# Patient Record
Sex: MALE | Race: WHITE | Employment: OTHER | URBAN - METROPOLITAN AREA
[De-identification: names, ages, dates, MRNs, and addresses within clinical notes are randomized per-mention and may not be internally consistent; named-entity substitution may affect disease eponyms.]

---

## 2017-05-10 ENCOUNTER — TELEPHONE (OUTPATIENT)
Dept: SURGERY | Age: 77
End: 2017-05-10

## 2017-05-16 ENCOUNTER — OFFICE VISIT (OUTPATIENT)
Dept: SURGERY | Age: 77
End: 2017-05-16

## 2017-05-16 VITALS
SYSTOLIC BLOOD PRESSURE: 130 MMHG | HEIGHT: 71 IN | WEIGHT: 168 LBS | DIASTOLIC BLOOD PRESSURE: 84 MMHG | BODY MASS INDEX: 23.52 KG/M2

## 2017-05-16 DIAGNOSIS — N50.819 PAIN IN TESTICLE: Primary | ICD-10-CM

## 2017-05-16 PROCEDURE — 99212 OFFICE O/P EST SF 10 MIN: CPT | Performed by: SURGERY

## 2025-01-15 ENCOUNTER — TELEPHONE (OUTPATIENT)
Dept: INTERVENTIONAL RADIOLOGY/VASCULAR | Age: 85
End: 2025-01-15

## 2025-01-15 NOTE — TELEPHONE ENCOUNTER
Call placed to [facility] to obtain imaging for patient.  Patient had CT completed at [facility].  Call placed to [facility], Spoke with Lissa, Images are being pushed to [facility].

## 2025-01-16 ENCOUNTER — TELEPHONE (OUTPATIENT)
Dept: INTERVENTIONAL RADIOLOGY/VASCULAR | Age: 85
End: 2025-01-16

## 2025-01-16 NOTE — TELEPHONE ENCOUNTER
Called and spoke to Patient about upcoming procedure. Phone number used: [phone]   Procedure: Suprapubic Catheter placement  Approving Radiologist: Job     Pt informed of the following:  Date of procedure: 1/27/25  Arrival time of procedure: 0730  Time of procedure: 0830  Check in at Main Entrance prior to procedure.    Pre Procedure Checklist:   H & P completed within 30 days of scheduled procedure?No  If No  Call placed to PCP N/A    Allergies:  Reviewed?Yes  Have you ever had a reaction to Contrast/ IV Dye? No  If Yes, What was reaction?   Lidocaine Allergy?  No  If Yes, What was reaction?     Medications:  On any blood thinners? Yes.   If yes: ASA  Instructed to hold: ASA 3-5 days before procedure starting on     Blood pressure medication?  Yes. If yes, take the morning of procedure.     Sedation:  Sedation Type:IV Sedation    Any issues with sedation in the past? No  If Yes, What was reaction?   Patient will need a  the day of procedure.     Nothing to eat or drink after midnight.        Need SDS: No    Orders:  Pre-procedure orders placed.Yes   Lab ordered: CBC and PT/INR    Post-procedure recovery will be here 2-3 hours.

## 2025-01-27 ENCOUNTER — HOSPITAL ENCOUNTER (OUTPATIENT)
Dept: CT IMAGING | Age: 85
Discharge: HOME | End: 2025-01-27
Payer: MEDICARE

## 2025-01-27 ENCOUNTER — HOSPITAL ENCOUNTER (OUTPATIENT)
Age: 85
Discharge: HOME | End: 2025-01-27
Payer: MEDICARE

## 2025-01-27 VITALS
RESPIRATION RATE: 17 BRPM | TEMPERATURE: 98.1 F | HEIGHT: 68 IN | OXYGEN SATURATION: 98 % | DIASTOLIC BLOOD PRESSURE: 84 MMHG | HEART RATE: 80 BPM | BODY MASS INDEX: 23.79 KG/M2 | WEIGHT: 157 LBS | SYSTOLIC BLOOD PRESSURE: 145 MMHG

## 2025-01-27 DIAGNOSIS — R35.0 FREQUENCY OF MICTURITION: ICD-10-CM

## 2025-01-27 LAB
DEPRECATED RDW RBC AUTO: 13.9 % (ref 12.4–15.4)
HCT VFR BLD AUTO: 37.6 % (ref 40.5–52.5)
HGB BLD-MCNC: 12.8 G/DL (ref 13.5–17.5)
INR PPP: 1.17 (ref 0.85–1.15)
MCH RBC QN AUTO: 29.1 PG (ref 26–34)
MCHC RBC AUTO-ENTMCNC: 33.9 G/DL (ref 31–36)
MCV RBC AUTO: 85.8 FL (ref 80–100)
PLATELET # BLD AUTO: 340 K/UL (ref 135–450)
PMV BLD AUTO: 6.5 FL (ref 5–10.5)
PROTHROMBIN TIME: 15.2 SEC (ref 11.9–14.9)
RBC # BLD AUTO: 4.39 M/UL (ref 4.2–5.9)
WBC # BLD AUTO: 8.4 K/UL (ref 4–11)

## 2025-01-27 PROCEDURE — C1729 CATH, DRAINAGE: HCPCS

## 2025-01-27 PROCEDURE — 85027 COMPLETE CBC AUTOMATED: CPT

## 2025-01-27 PROCEDURE — 85610 PROTHROMBIN TIME: CPT

## 2025-01-27 PROCEDURE — 99152 MOD SED SAME PHYS/QHP 5/>YRS: CPT

## 2025-01-27 PROCEDURE — 36415 COLL VENOUS BLD VENIPUNCTURE: CPT

## 2025-01-27 PROCEDURE — 6360000002 HC RX W HCPCS: Performed by: STUDENT IN AN ORGANIZED HEALTH CARE EDUCATION/TRAINING PROGRAM

## 2025-01-27 RX ORDER — MIDAZOLAM HYDROCHLORIDE 5 MG/ML
INJECTION, SOLUTION INTRAMUSCULAR; INTRAVENOUS PRN
Status: COMPLETED | OUTPATIENT
Start: 2025-01-27 | End: 2025-01-27

## 2025-01-27 RX ORDER — SODIUM CHLORIDE 0.9 % (FLUSH) 0.9 %
5-40 SYRINGE (ML) INJECTION PRN
Status: DISCONTINUED | OUTPATIENT
Start: 2025-01-27 | End: 2025-01-28 | Stop reason: HOSPADM

## 2025-01-27 RX ORDER — SODIUM CHLORIDE 9 MG/ML
INJECTION, SOLUTION INTRAVENOUS PRN
Status: DISCONTINUED | OUTPATIENT
Start: 2025-01-27 | End: 2025-01-28 | Stop reason: HOSPADM

## 2025-01-27 RX ORDER — SODIUM CHLORIDE 0.9 % (FLUSH) 0.9 %
5-40 SYRINGE (ML) INJECTION EVERY 12 HOURS SCHEDULED
Status: DISCONTINUED | OUTPATIENT
Start: 2025-01-27 | End: 2025-01-28 | Stop reason: HOSPADM

## 2025-01-27 RX ORDER — ONDANSETRON 2 MG/ML
4 INJECTION INTRAMUSCULAR; INTRAVENOUS EVERY 8 HOURS PRN
Status: DISCONTINUED | OUTPATIENT
Start: 2025-01-27 | End: 2025-01-28 | Stop reason: HOSPADM

## 2025-01-27 RX ORDER — FENTANYL CITRATE 50 UG/ML
INJECTION, SOLUTION INTRAMUSCULAR; INTRAVENOUS PRN
Status: COMPLETED | OUTPATIENT
Start: 2025-01-27 | End: 2025-01-27

## 2025-01-27 RX ADMIN — MIDAZOLAM HYDROCHLORIDE 1 MG: 5 INJECTION, SOLUTION INTRAMUSCULAR; INTRAVENOUS at 09:02

## 2025-01-27 RX ADMIN — FENTANYL CITRATE 50 MCG: 50 INJECTION INTRAMUSCULAR; INTRAVENOUS at 09:01

## 2025-01-27 RX ADMIN — MIDAZOLAM HYDROCHLORIDE 0.5 MG: 5 INJECTION, SOLUTION INTRAMUSCULAR; INTRAVENOUS at 09:09

## 2025-01-27 RX ADMIN — FENTANYL CITRATE 25 MCG: 50 INJECTION INTRAMUSCULAR; INTRAVENOUS at 09:09

## 2025-01-27 ASSESSMENT — PAIN - FUNCTIONAL ASSESSMENT: PAIN_FUNCTIONAL_ASSESSMENT: NONE - DENIES PAIN

## 2025-01-27 NOTE — PROGRESS NOTES
Pt daughters at the bedside for support. Education and teaching demonstration for changing bag provided. Verbalized understanding.

## 2025-01-27 NOTE — BRIEF OP NOTE
Interventional Radiology  Brief Postoperative Note    Patient: Tj Barker  YOB: 1940  MRN: [id number]      Pre-operative Diagnosis: Urinary retention    Post-operative Diagnosis: Same    Procedure: CT guided suprapubic catheter placement    Anesthesia: Local and Moderate Sedation    Surgeons/Assistants: Artis Kaiser DO    Estimated Blood Loss: less than 50     Complications: None    Infection Present At Time Of Surgery (PATOS) (choose all levels that have infection present):  No infection present    Specimens: Was Not Obtained    Findings:   Successful placement of a 16-F locking pigtail suprapubic catheter.       Artis Kaiser DO  1/27/2025, 9:16 AM  Interventional Radiology  [phone])

## 2025-01-27 NOTE — PRE SEDATION
Sedation Pre-Procedure Note    Patient Name: Tj Barker   YOB: 1940  Room/Bed: Room/bed info not found  Medical Record Number: [id number]  Date: 1/27/2025   Time: 8:26 AM       Indication:  Urinary retention, suprapubic catheter placement requested.     Consent: I have discussed with the patient and/or the patient representative the indication, alternatives, and the possible risks and/or complications of the planned procedure and the anesthesia methods. The patient and/or patient representative appear to understand and agree to proceed.    Vital Signs:   Vitals:    01/27/25 0740   BP: 121/64   Pulse: 84   Resp: 18   Temp: 98.1 °F (36.7 °C)   SpO2: 97%       Past Medical History:   has a past medical history of BPH (benign prostatic hyperplasia), Cancer (HCC), Hyperlipidemia, Hypertension, and Parkinson disease (HCC).    Past Surgical History:   has a past surgical history that includes other surgical history; Inguinal hernia repair (Bilateral, 6/3/2016); and other surgical history (Right, 6/3/2016).    Medications:   Scheduled Meds:    sodium chloride flush  5-40 mL IntraVENous 2 times per day     Continuous Infusions:    sodium chloride       PRN Meds: sodium chloride flush, sodium chloride  Home Meds:   Prior to Admission medications    Medication Sig Start Date End Date Taking? Authorizing Provider   carbidopa-levodopa (PARCOPA)  MG TBDP Take 2 tablets by mouth 5 (five) times a day   Yes ProviderVern MD   gabapentin (NEURONTIN) 300 MG capsule Take 1 capsule by mouth 2 times daily.   Yes Provider, MD Vern   mirtazapine (REMERON) 15 MG tablet Take 1 tablet by mouth nightly   Yes ProviderVern MD   DULoxetine (CYMBALTA) 30 MG extended release capsule Take 1 capsule by mouth daily   Yes ProviderVern MD   doxazosin (CARDURA) 8 MG tablet Take 1 tablet by mouth nightly   Yes ProviderVern MD   atorvastatin (LIPITOR) 20 MG tablet Take 1 tablet by mouth

## 2025-01-27 NOTE — OR NURSING
Pt arrived for image guided sp tube insertion mid abd . Procedure explained including the risk and benefits of the procedure. All questions answered. Pt verbalizes understanding of the procedure and states no more questions. Consent confirmed. Vital signs stable. Labs, allergies, medications, and code status reviewed. No contraindications noted.     Vital Signs  Vitals:    01/27/25 0859   BP: (!) 159/83   Pulse: 80   Resp: 17   Temp:    SpO2: 100%    (vital signs in table format)    Pre Arturo Score  2 - Able to move 4 extremities voluntarily on command  2 - BP+/- 20mmHg of normal  2 - Fully awake  2 - Able to maintain oxygen saturation >92% on room air  2 - Able to breathe deeply and cough freely    Allergies  Oxycodone (allergies)    Labs  Lab Results   Component Value Date    INR 1.17 (H) 01/27/2025    PROTIME 15.2 (H) 01/27/2025     Lab Results   Component Value Date    CREATININE 0.8 06/03/2016    BUN 13 06/03/2016     06/03/2016    K 3.9 06/03/2016     06/03/2016    CO2 29 06/03/2016     Lab Results   Component Value Date    WBC 8.4 01/27/2025    HGB 12.8 (L) 01/27/2025    HCT 37.6 (L) 01/27/2025    MCV 85.8 01/27/2025     01/27/2025

## 2025-01-27 NOTE — OR NURSING
Image guided SP TUBE insertion MID ABD completed. Dr. Kaiser placed 16 Vincentian 25 cm Argon Skater drain LOT # [id number] EXP 10/10/29 in the MID ABD. Drain/tube secured at the 13 cm line with SUTURES. Drain/tube dressing clean, dry, and intact. Pt tolerated procedure without any signs or symptoms of distress. Vital signs stable. Report called to IR TEAM RN. Pt transported back to [facility] in stable condition via bed by transport.     Medications  Versed: 1.5 mg  Fentanyl: 75 mcg    This RN wasted the following with DOROTHY BARRON.  0.5 mg Versed  25 mcg Fentanyl     Vital Signs  Vitals:    01/27/25 0909   BP: (!) 151/88   Pulse: 81   Resp: 16   Temp:    SpO2: 100%    (vital signs in table format)    Post Arturo  2 - Able to move 4 extremities voluntarily on command  2 - BP+/- 20mmHg of normal  2 - Fully awake  1 - Needs oxygen to maintain oxygen saturation >90%  2 - Able to breathe deeply and cough freely

## 2025-02-14 ENCOUNTER — APPOINTMENT (OUTPATIENT)
Dept: GENERAL RADIOLOGY | Age: 85
DRG: 698 | End: 2025-02-14
Payer: MEDICARE

## 2025-02-14 ENCOUNTER — HOSPITAL ENCOUNTER (INPATIENT)
Age: 85
LOS: 3 days | Discharge: NURSING, REHAB OR LONG-TERM CARE | DRG: 698 | End: 2025-02-17
Attending: STUDENT IN AN ORGANIZED HEALTH CARE EDUCATION/TRAINING PROGRAM | Admitting: INTERNAL MEDICINE
Payer: MEDICARE

## 2025-02-14 ENCOUNTER — APPOINTMENT (OUTPATIENT)
Dept: CT IMAGING | Age: 85
DRG: 698 | End: 2025-02-14
Payer: MEDICARE

## 2025-02-14 DIAGNOSIS — N10 ACUTE PYELONEPHRITIS: ICD-10-CM

## 2025-02-14 DIAGNOSIS — N28.1 COMPLEX RENAL CYST: Primary | ICD-10-CM

## 2025-02-14 DIAGNOSIS — R60.9 PITTING EDEMA: ICD-10-CM

## 2025-02-14 PROBLEM — N30.01 ACUTE CYSTITIS WITH HEMATURIA: Status: ACTIVE | Noted: 2025-02-14

## 2025-02-14 LAB
ALBUMIN SERPL-MCNC: 3.4 G/DL (ref 3.4–5)
ALBUMIN/GLOB SERPL: 1 {RATIO} (ref 1.1–2.2)
ALP SERPL-CCNC: 67 U/L (ref 40–129)
ALT SERPL-CCNC: <5 U/L (ref 10–40)
AMORPH SED URNS QL MICRO: ABNORMAL /HPF
ANION GAP SERPL CALCULATED.3IONS-SCNC: 10 MMOL/L (ref 3–16)
AST SERPL-CCNC: 17 U/L (ref 15–37)
BASOPHILS # BLD: 0.1 K/UL (ref 0–0.2)
BASOPHILS NFR BLD: 0.4 %
BILIRUB SERPL-MCNC: <0.2 MG/DL (ref 0–1)
BILIRUB UR QL STRIP.AUTO: ABNORMAL
BUN SERPL-MCNC: 23 MG/DL (ref 7–20)
CALCIUM SERPL-MCNC: 8.7 MG/DL (ref 8.3–10.6)
CHARACTER UR: ABNORMAL
CHLORIDE SERPL-SCNC: 99 MMOL/L (ref 99–110)
CLARITY UR: CLEAR
CO2 SERPL-SCNC: 28 MMOL/L (ref 21–32)
COLOR UR: ABNORMAL
CREAT SERPL-MCNC: 0.6 MG/DL (ref 0.8–1.3)
CRYSTALS URNS MICRO: ABNORMAL /HPF
DEPRECATED RDW RBC AUTO: 14.5 % (ref 12.4–15.4)
EOSINOPHIL # BLD: 0.2 K/UL (ref 0–0.6)
EOSINOPHIL NFR BLD: 1.3 %
FLUAV RNA RESP QL NAA+PROBE: NOT DETECTED
FLUBV RNA RESP QL NAA+PROBE: NOT DETECTED
GFR SERPLBLD CREATININE-BSD FMLA CKD-EPI: >90 ML/MIN/{1.73_M2}
GLUCOSE SERPL-MCNC: 133 MG/DL (ref 70–99)
GLUCOSE UR STRIP.AUTO-MCNC: NEGATIVE MG/DL
HCT VFR BLD AUTO: 38.3 % (ref 40.5–52.5)
HGB BLD-MCNC: 12.8 G/DL (ref 13.5–17.5)
HGB UR QL STRIP.AUTO: NEGATIVE
KETONES UR STRIP.AUTO-MCNC: NEGATIVE MG/DL
LACTATE BLDV-SCNC: 1.3 MMOL/L (ref 0.4–1.9)
LEUKOCYTE ESTERASE UR QL STRIP.AUTO: ABNORMAL
LYMPHOCYTES # BLD: 0.9 K/UL (ref 1–5.1)
LYMPHOCYTES NFR BLD: 7.3 %
MCH RBC QN AUTO: 28.1 PG (ref 26–34)
MCHC RBC AUTO-ENTMCNC: 33.5 G/DL (ref 31–36)
MCV RBC AUTO: 83.9 FL (ref 80–100)
MONOCYTES # BLD: 0.9 K/UL (ref 0–1.3)
MONOCYTES NFR BLD: 6.8 %
NEUTROPHILS # BLD: 10.6 K/UL (ref 1.7–7.7)
NEUTROPHILS NFR BLD: 84.2 %
NITRITE UR QL STRIP.AUTO: POSITIVE
NT-PROBNP SERPL-MCNC: 220 PG/ML (ref 0–449)
PH UR STRIP.AUTO: 8.5 [PH] (ref 5–8)
PLATELET # BLD AUTO: 342 K/UL (ref 135–450)
PMV BLD AUTO: 6.7 FL (ref 5–10.5)
POTASSIUM SERPL-SCNC: 3.7 MMOL/L (ref 3.5–5.1)
PROCALCITONIN SERPL IA-MCNC: 0.04 NG/ML (ref 0–0.15)
PROT SERPL-MCNC: 6.8 G/DL (ref 6.4–8.2)
PROT UR STRIP.AUTO-MCNC: >=300 MG/DL
RBC # BLD AUTO: 4.56 M/UL (ref 4.2–5.9)
RBC #/AREA URNS HPF: ABNORMAL /HPF (ref 0–4)
RSV BY PCR: NOT DETECTED
SARS-COV-2 RNA RESP QL NAA+PROBE: NOT DETECTED
SODIUM SERPL-SCNC: 137 MMOL/L (ref 136–145)
SP GR UR STRIP.AUTO: <=1.005 (ref 1–1.03)
TROPONIN, HIGH SENSITIVITY: 40 NG/L (ref 0–22)
UA COMPLETE W REFLEX CULTURE PNL UR: ABNORMAL
UA DIPSTICK W REFLEX MICRO PNL UR: YES
URN SPEC COLLECT METH UR: ABNORMAL
UROBILINOGEN UR STRIP-ACNC: 0.2 E.U./DL
WBC # BLD AUTO: 12.5 K/UL (ref 4–11)
WBC #/AREA URNS HPF: ABNORMAL /HPF (ref 0–5)

## 2025-02-14 PROCEDURE — 83605 ASSAY OF LACTIC ACID: CPT

## 2025-02-14 PROCEDURE — 51798 US URINE CAPACITY MEASURE: CPT

## 2025-02-14 PROCEDURE — 85025 COMPLETE CBC W/AUTO DIFF WBC: CPT

## 2025-02-14 PROCEDURE — 82570 ASSAY OF URINE CREATININE: CPT

## 2025-02-14 PROCEDURE — 87077 CULTURE AEROBIC IDENTIFY: CPT

## 2025-02-14 PROCEDURE — 93005 ELECTROCARDIOGRAM TRACING: CPT

## 2025-02-14 PROCEDURE — 6360000004 HC RX CONTRAST MEDICATION

## 2025-02-14 PROCEDURE — 99285 EMERGENCY DEPT VISIT HI MDM: CPT

## 2025-02-14 PROCEDURE — 96365 THER/PROPH/DIAG IV INF INIT: CPT

## 2025-02-14 PROCEDURE — 6370000000 HC RX 637 (ALT 250 FOR IP)

## 2025-02-14 PROCEDURE — 84145 PROCALCITONIN (PCT): CPT

## 2025-02-14 PROCEDURE — 84156 ASSAY OF PROTEIN URINE: CPT

## 2025-02-14 PROCEDURE — 84484 ASSAY OF TROPONIN QUANT: CPT

## 2025-02-14 PROCEDURE — 80053 COMPREHEN METABOLIC PANEL: CPT

## 2025-02-14 PROCEDURE — 81001 URINALYSIS AUTO W/SCOPE: CPT

## 2025-02-14 PROCEDURE — 2580000003 HC RX 258

## 2025-02-14 PROCEDURE — 87186 SC STD MICRODIL/AGAR DIL: CPT

## 2025-02-14 PROCEDURE — 6360000002 HC RX W HCPCS

## 2025-02-14 PROCEDURE — 87086 URINE CULTURE/COLONY COUNT: CPT

## 2025-02-14 PROCEDURE — 96375 TX/PRO/DX INJ NEW DRUG ADDON: CPT

## 2025-02-14 PROCEDURE — 83880 ASSAY OF NATRIURETIC PEPTIDE: CPT

## 2025-02-14 PROCEDURE — 74177 CT ABD & PELVIS W/CONTRAST: CPT

## 2025-02-14 PROCEDURE — 71045 X-RAY EXAM CHEST 1 VIEW: CPT

## 2025-02-14 PROCEDURE — 1200000000 HC SEMI PRIVATE

## 2025-02-14 RX ORDER — IOPAMIDOL 755 MG/ML
75 INJECTION, SOLUTION INTRAVASCULAR
Status: COMPLETED | OUTPATIENT
Start: 2025-02-14 | End: 2025-02-14

## 2025-02-14 RX ORDER — KETOROLAC TROMETHAMINE 15 MG/ML
15 INJECTION, SOLUTION INTRAMUSCULAR; INTRAVENOUS ONCE
Status: COMPLETED | OUTPATIENT
Start: 2025-02-14 | End: 2025-02-14

## 2025-02-14 RX ORDER — ACETAMINOPHEN 325 MG/1
650 TABLET ORAL EVERY 6 HOURS PRN
Status: DISCONTINUED | OUTPATIENT
Start: 2025-02-14 | End: 2025-02-17 | Stop reason: HOSPADM

## 2025-02-14 RX ORDER — ACETAMINOPHEN 500 MG
1000 TABLET ORAL ONCE
Status: COMPLETED | OUTPATIENT
Start: 2025-02-14 | End: 2025-02-14

## 2025-02-14 RX ORDER — ONDANSETRON 2 MG/ML
4 INJECTION INTRAMUSCULAR; INTRAVENOUS EVERY 6 HOURS PRN
Status: DISCONTINUED | OUTPATIENT
Start: 2025-02-14 | End: 2025-02-17 | Stop reason: HOSPADM

## 2025-02-14 RX ORDER — SODIUM CHLORIDE 0.9 % (FLUSH) 0.9 %
10 SYRINGE (ML) INJECTION EVERY 12 HOURS SCHEDULED
Status: DISCONTINUED | OUTPATIENT
Start: 2025-02-15 | End: 2025-02-17 | Stop reason: HOSPADM

## 2025-02-14 RX ORDER — ASPIRIN 81 MG/1
81 TABLET, CHEWABLE ORAL DAILY
Status: DISCONTINUED | OUTPATIENT
Start: 2025-02-15 | End: 2025-02-17 | Stop reason: HOSPADM

## 2025-02-14 RX ORDER — SODIUM CHLORIDE 9 MG/ML
INJECTION, SOLUTION INTRAVENOUS CONTINUOUS
Status: ACTIVE | OUTPATIENT
Start: 2025-02-15 | End: 2025-02-15

## 2025-02-14 RX ORDER — VANCOMYCIN 1.25 G/250ML
1750 INJECTION, SOLUTION INTRAVENOUS ONCE
Status: COMPLETED | OUTPATIENT
Start: 2025-02-14 | End: 2025-02-15

## 2025-02-14 RX ORDER — NICOTINE 21 MG/24HR
1 PATCH, TRANSDERMAL 24 HOURS TRANSDERMAL DAILY PRN
Status: DISCONTINUED | OUTPATIENT
Start: 2025-02-14 | End: 2025-02-17 | Stop reason: HOSPADM

## 2025-02-14 RX ORDER — ACETAMINOPHEN 650 MG/1
650 SUPPOSITORY RECTAL EVERY 6 HOURS PRN
Status: DISCONTINUED | OUTPATIENT
Start: 2025-02-14 | End: 2025-02-17 | Stop reason: HOSPADM

## 2025-02-14 RX ORDER — SODIUM CHLORIDE 0.9 % (FLUSH) 0.9 %
10 SYRINGE (ML) INJECTION PRN
Status: DISCONTINUED | OUTPATIENT
Start: 2025-02-14 | End: 2025-02-17 | Stop reason: HOSPADM

## 2025-02-14 RX ORDER — PROMETHAZINE HYDROCHLORIDE 25 MG/1
12.5 TABLET ORAL EVERY 6 HOURS PRN
Status: DISCONTINUED | OUTPATIENT
Start: 2025-02-14 | End: 2025-02-17 | Stop reason: HOSPADM

## 2025-02-14 RX ORDER — ATORVASTATIN CALCIUM 10 MG/1
20 TABLET, FILM COATED ORAL DAILY
Status: DISCONTINUED | OUTPATIENT
Start: 2025-02-15 | End: 2025-02-17 | Stop reason: HOSPADM

## 2025-02-14 RX ORDER — MIRTAZAPINE 15 MG/1
15 TABLET, FILM COATED ORAL NIGHTLY
Status: DISCONTINUED | OUTPATIENT
Start: 2025-02-15 | End: 2025-02-15

## 2025-02-14 RX ORDER — SODIUM CHLORIDE 9 MG/ML
INJECTION, SOLUTION INTRAVENOUS PRN
Status: DISCONTINUED | OUTPATIENT
Start: 2025-02-14 | End: 2025-02-17 | Stop reason: HOSPADM

## 2025-02-14 RX ORDER — GABAPENTIN 300 MG/1
300 CAPSULE ORAL
Status: DISCONTINUED | OUTPATIENT
Start: 2025-02-15 | End: 2025-02-17 | Stop reason: HOSPADM

## 2025-02-14 RX ORDER — DULOXETIN HYDROCHLORIDE 60 MG/1
60 CAPSULE, DELAYED RELEASE ORAL DAILY
Status: DISCONTINUED | OUTPATIENT
Start: 2025-02-15 | End: 2025-02-17 | Stop reason: HOSPADM

## 2025-02-14 RX ORDER — CARBIDOPA AND LEVODOPA 25; 100 MG/1; MG/1
2 TABLET ORAL
Status: DISCONTINUED | OUTPATIENT
Start: 2025-02-15 | End: 2025-02-17 | Stop reason: HOSPADM

## 2025-02-14 RX ADMIN — KETOROLAC TROMETHAMINE 15 MG: 15 INJECTION, SOLUTION INTRAMUSCULAR; INTRAVENOUS at 19:14

## 2025-02-14 RX ADMIN — ACETAMINOPHEN 1000 MG: 500 TABLET ORAL at 19:14

## 2025-02-14 RX ADMIN — IOPAMIDOL 75 ML: 755 INJECTION, SOLUTION INTRAVENOUS at 18:48

## 2025-02-14 RX ADMIN — SODIUM CHLORIDE 1000 MG: 900 INJECTION INTRAVENOUS at 20:17

## 2025-02-14 RX ADMIN — HYDROMORPHONE HYDROCHLORIDE 1 MG: 1 INJECTION, SOLUTION INTRAMUSCULAR; INTRAVENOUS; SUBCUTANEOUS at 21:51

## 2025-02-14 ASSESSMENT — PAIN SCALES - GENERAL
PAINLEVEL_OUTOF10: 6
PAINLEVEL_OUTOF10: 8

## 2025-02-14 NOTE — ED PROVIDER NOTES
[facility] EMERGENCY DEPARTMENT  EMERGENCY DEPARTMENT ENCOUNTER        Pt Name: Tj Barker  MRN: [id number]  Birthdate 1940  Date of evaluation: 2/14/2025  Provider: GIORGIO Flores CNP  PCP: Mal Em MD  Note Started: 6:01 PM EST 2/14/25      KARO. I have evaluated this patient.        CHIEF COMPLAINT       Chief Complaint   Patient presents with    Urinary Catheter       HISTORY OF PRESENT ILLNESS: 1 or more Elements     History From: Patient, family member at bedside, EMR review    Chief Complaint: Suprapubic catheter malfunction    Tj Barker is a 84 y.o. male with a past medical history notable for hypertension, hyperlipidemia, BPH, Parkinson's disease who presents to the emergency department with concerns for suprapubic catheter dysfunction.  States that he has suprapubic catheter placed for BPH with Dr. Bedolla through the urology group approximately 2 weeks ago.  The family member at bedside states that the patient had an episode where he spontaneously voided through his urethra as his depends was full.  Has had approximately 250 mL urinary output in the suprapubic catheter bag today.  Patient feels a full sensation in the suprapubic region.  States that while they did contact the urology clinic they waited until about 5 PM to do so and it was too late to be seen in the clinic and therefore they were referred to the emergency department.  The patient himself denies any other subjective complaints outside of a full sensation within the suprapubic region.    Nursing Notes were all reviewed and agreed with or any disagreements were addressed in the HPI.    REVIEW OF SYSTEMS :      Review of Systems   Constitutional:  Negative for chills, fatigue and fever.   Respiratory:  Negative for chest tightness and shortness of breath.    Cardiovascular:  Negative for chest pain and palpitations.   Gastrointestinal:  Positive for abdominal pain (Suprapubic). Negative for nausea

## 2025-02-14 NOTE — ED TRIAGE NOTES
Patient presents to the ED from home with c/o urinary catheter problem. Patient states he feels like his catheter isnt draining and he feels a full sensation. Patient wants his catheter changed.

## 2025-02-15 ENCOUNTER — APPOINTMENT (OUTPATIENT)
Age: 85
DRG: 698 | End: 2025-02-15
Attending: INTERNAL MEDICINE
Payer: MEDICARE

## 2025-02-15 LAB
ALBUMIN SERPL-MCNC: 3.1 G/DL (ref 3.4–5)
ALBUMIN/GLOB SERPL: 1 {RATIO} (ref 1.1–2.2)
ALP SERPL-CCNC: 62 U/L (ref 40–129)
ALT SERPL-CCNC: <5 U/L (ref 10–40)
ANION GAP SERPL CALCULATED.3IONS-SCNC: 10 MMOL/L (ref 3–16)
AST SERPL-CCNC: 16 U/L (ref 15–37)
BASOPHILS # BLD: 0.1 K/UL (ref 0–0.2)
BASOPHILS NFR BLD: 0.7 %
BILIRUB SERPL-MCNC: 0.3 MG/DL (ref 0–1)
BUN SERPL-MCNC: 25 MG/DL (ref 7–20)
CALCIUM SERPL-MCNC: 8.2 MG/DL (ref 8.3–10.6)
CHLORIDE SERPL-SCNC: 102 MMOL/L (ref 99–110)
CO2 SERPL-SCNC: 25 MMOL/L (ref 21–32)
CREAT SERPL-MCNC: 0.7 MG/DL (ref 0.8–1.3)
DEPRECATED RDW RBC AUTO: 14.2 % (ref 12.4–15.4)
ECHO AO ROOT DIAM: 3.6 CM
ECHO AO ROOT INDEX: 1.91 CM/M2
ECHO AV AREA PEAK VELOCITY: 1.6 CM2
ECHO AV AREA VTI: 1.7 CM2
ECHO AV AREA/BSA PEAK VELOCITY: 0.9 CM2/M2
ECHO AV AREA/BSA VTI: 0.9 CM2/M2
ECHO AV MEAN GRADIENT: 17 MMHG
ECHO AV MEAN VELOCITY: 1.9 M/S
ECHO AV PEAK GRADIENT: 34 MMHG
ECHO AV PEAK VELOCITY: 2.9 M/S
ECHO AV VELOCITY RATIO: 0.41
ECHO AV VTI: 58.2 CM
ECHO BSA: 1.88 M2
ECHO EST RA PRESSURE: 8 MMHG
ECHO LA AREA 2C: 24.5 CM2
ECHO LA AREA 4C: 24.5 CM2
ECHO LA MAJOR AXIS: 6.3 CM
ECHO LA MINOR AXIS: 5.8 CM
ECHO LA VOL BP: 83 ML (ref 18–58)
ECHO LA VOL MOD A2C: 83 ML (ref 18–58)
ECHO LA VOL MOD A4C: 68 ML (ref 18–58)
ECHO LA VOL/BSA BIPLANE: 44 ML/M2 (ref 16–34)
ECHO LA VOLUME INDEX MOD A2C: 44 ML/M2 (ref 16–34)
ECHO LA VOLUME INDEX MOD A4C: 36 ML/M2 (ref 16–34)
ECHO LV E' LATERAL VELOCITY: 7.51 CM/S
ECHO LV E' SEPTAL VELOCITY: 6.09 CM/S
ECHO LV EF PHYSICIAN: 55 %
ECHO LV FRACTIONAL SHORTENING: 29 % (ref 28–44)
ECHO LV INTERNAL DIMENSION DIASTOLE INDEX: 1.81 CM/M2
ECHO LV INTERNAL DIMENSION DIASTOLIC: 3.4 CM (ref 4.2–5.9)
ECHO LV INTERNAL DIMENSION SYSTOLIC INDEX: 1.28 CM/M2
ECHO LV INTERNAL DIMENSION SYSTOLIC: 2.4 CM
ECHO LV IVSD: 1.1 CM (ref 0.6–1)
ECHO LV MASS 2D: 106.3 G (ref 88–224)
ECHO LV MASS INDEX 2D: 56.6 G/M2 (ref 49–115)
ECHO LV POSTERIOR WALL DIASTOLIC: 1 CM (ref 0.6–1)
ECHO LV RELATIVE WALL THICKNESS RATIO: 0.59
ECHO LVOT AREA: 3.8 CM2
ECHO LVOT AV VTI INDEX: 0.45
ECHO LVOT DIAM: 2.2 CM
ECHO LVOT MEAN GRADIENT: 3 MMHG
ECHO LVOT PEAK GRADIENT: 6 MMHG
ECHO LVOT PEAK VELOCITY: 1.2 M/S
ECHO LVOT STROKE VOLUME INDEX: 53.4 ML/M2
ECHO LVOT SV: 100.3 ML
ECHO LVOT VTI: 26.4 CM
ECHO MV A VELOCITY: 1.4 M/S
ECHO MV AREA VTI: 3 CM2
ECHO MV E DECELERATION TIME (DT): 412 MS
ECHO MV E VELOCITY: 0.9 M/S
ECHO MV E/A RATIO: 0.64
ECHO MV E/E' LATERAL: 11.98
ECHO MV E/E' RATIO (AVERAGED): 13.38
ECHO MV E/E' SEPTAL: 14.78
ECHO MV LVOT VTI INDEX: 1.25
ECHO MV MAX VELOCITY: 1.6 M/S
ECHO MV MEAN GRADIENT: 10 MMHG
ECHO MV MEAN VELOCITY: 0.9 M/S
ECHO MV PEAK GRADIENT: 10 MMHG
ECHO MV VTI: 32.9 CM
ECHO PV MAX VELOCITY: 1 M/S
ECHO PV MEAN GRADIENT: 3 MMHG
ECHO PV MEAN VELOCITY: 0.8 M/S
ECHO PV PEAK GRADIENT: 4 MMHG
ECHO PV VTI: 26.9 CM
ECHO RA AREA 4C: 20.9 CM2
ECHO RA END SYSTOLIC VOLUME APICAL 4 CHAMBER INDEX BSA: 31 ML/M2
ECHO RA VOLUME: 59 ML
ECHO RIGHT VENTRICULAR SYSTOLIC PRESSURE (RVSP): 24 MMHG
ECHO RV BASAL DIMENSION: 4.4 CM
ECHO RV FREE WALL PEAK S': 11.1 CM/S
ECHO RV LONGITUDINAL DIMENSION: 7.6 CM
ECHO RV MID DIMENSION: 3.2 CM
ECHO RV TAPSE: 2.3 CM (ref 1.7–?)
ECHO TV REGURGITANT MAX VELOCITY: 2 M/S
ECHO TV REGURGITANT PEAK GRADIENT: 17 MMHG
EKG ATRIAL RATE: 104 BPM
EKG DIAGNOSIS: NORMAL
EKG P AXIS: 40 DEGREES
EKG P-R INTERVAL: 184 MS
EKG Q-T INTERVAL: 346 MS
EKG QRS DURATION: 78 MS
EKG QTC CALCULATION (BAZETT): 454 MS
EKG R AXIS: 26 DEGREES
EKG T AXIS: 77 DEGREES
EKG VENTRICULAR RATE: 104 BPM
EOSINOPHIL # BLD: 0.2 K/UL (ref 0–0.6)
EOSINOPHIL NFR BLD: 1.5 %
GFR SERPLBLD CREATININE-BSD FMLA CKD-EPI: 89 ML/MIN/{1.73_M2}
GLUCOSE SERPL-MCNC: 110 MG/DL (ref 70–99)
HCT VFR BLD AUTO: 35.1 % (ref 40.5–52.5)
HGB BLD-MCNC: 12 G/DL (ref 13.5–17.5)
LYMPHOCYTES # BLD: 1.2 K/UL (ref 1–5.1)
LYMPHOCYTES NFR BLD: 11.3 %
MCH RBC QN AUTO: 28.5 PG (ref 26–34)
MCHC RBC AUTO-ENTMCNC: 34.3 G/DL (ref 31–36)
MCV RBC AUTO: 83.1 FL (ref 80–100)
MONOCYTES # BLD: 0.9 K/UL (ref 0–1.3)
MONOCYTES NFR BLD: 8.3 %
NEUTROPHILS # BLD: 8.4 K/UL (ref 1.7–7.7)
NEUTROPHILS NFR BLD: 78.2 %
PLATELET # BLD AUTO: 298 K/UL (ref 135–450)
PMV BLD AUTO: 6.7 FL (ref 5–10.5)
POTASSIUM SERPL-SCNC: 3.7 MMOL/L (ref 3.5–5.1)
POTASSIUM SERPL-SCNC: 3.7 MMOL/L (ref 3.5–5.1)
PROT SERPL-MCNC: 6.2 G/DL (ref 6.4–8.2)
RBC # BLD AUTO: 4.22 M/UL (ref 4.2–5.9)
SODIUM SERPL-SCNC: 137 MMOL/L (ref 136–145)
TROPONIN, HIGH SENSITIVITY: 50 NG/L (ref 0–22)
WBC # BLD AUTO: 10.7 K/UL (ref 4–11)

## 2025-02-15 PROCEDURE — 6370000000 HC RX 637 (ALT 250 FOR IP): Performed by: INTERNAL MEDICINE

## 2025-02-15 PROCEDURE — 1200000000 HC SEMI PRIVATE

## 2025-02-15 PROCEDURE — 85025 COMPLETE CBC W/AUTO DIFF WBC: CPT

## 2025-02-15 PROCEDURE — 97535 SELF CARE MNGMENT TRAINING: CPT

## 2025-02-15 PROCEDURE — 93306 TTE W/DOPPLER COMPLETE: CPT | Performed by: INTERNAL MEDICINE

## 2025-02-15 PROCEDURE — 36415 COLL VENOUS BLD VENIPUNCTURE: CPT

## 2025-02-15 PROCEDURE — 6360000002 HC RX W HCPCS: Performed by: INTERNAL MEDICINE

## 2025-02-15 PROCEDURE — 2500000003 HC RX 250 WO HCPCS: Performed by: INTERNAL MEDICINE

## 2025-02-15 PROCEDURE — 97530 THERAPEUTIC ACTIVITIES: CPT

## 2025-02-15 PROCEDURE — 93010 ELECTROCARDIOGRAM REPORT: CPT | Performed by: INTERNAL MEDICINE

## 2025-02-15 PROCEDURE — 97166 OT EVAL MOD COMPLEX 45 MIN: CPT

## 2025-02-15 PROCEDURE — 2580000003 HC RX 258: Performed by: INTERNAL MEDICINE

## 2025-02-15 PROCEDURE — 80053 COMPREHEN METABOLIC PANEL: CPT

## 2025-02-15 PROCEDURE — 84484 ASSAY OF TROPONIN QUANT: CPT

## 2025-02-15 PROCEDURE — 93306 TTE W/DOPPLER COMPLETE: CPT

## 2025-02-15 PROCEDURE — 97162 PT EVAL MOD COMPLEX 30 MIN: CPT

## 2025-02-15 PROCEDURE — 99232 SBSQ HOSP IP/OBS MODERATE 35: CPT | Performed by: INTERNAL MEDICINE

## 2025-02-15 PROCEDURE — 87040 BLOOD CULTURE FOR BACTERIA: CPT

## 2025-02-15 PROCEDURE — 6370000000 HC RX 637 (ALT 250 FOR IP)

## 2025-02-15 RX ORDER — DOXAZOSIN 2 MG/1
8 TABLET ORAL NIGHTLY
Status: DISCONTINUED | OUTPATIENT
Start: 2025-02-15 | End: 2025-02-17 | Stop reason: HOSPADM

## 2025-02-15 RX ORDER — POLYETHYLENE GLYCOL 3350 17 G/17G
8.5 POWDER, FOR SOLUTION ORAL EVERY OTHER DAY
COMMUNITY

## 2025-02-15 RX ADMIN — SODIUM CHLORIDE: 0.9 INJECTION, SOLUTION INTRAVENOUS at 01:26

## 2025-02-15 RX ADMIN — GABAPENTIN 300 MG: 300 CAPSULE ORAL at 01:26

## 2025-02-15 RX ADMIN — CARBIDOPA AND LEVODOPA 2 TABLET: 25; 100 TABLET ORAL at 11:43

## 2025-02-15 RX ADMIN — SODIUM CHLORIDE, PRESERVATIVE FREE 10 ML: 5 INJECTION INTRAVENOUS at 20:17

## 2025-02-15 RX ADMIN — CEFEPIME 2000 MG: 2 INJECTION, POWDER, FOR SOLUTION INTRAVENOUS at 17:52

## 2025-02-15 RX ADMIN — ACETAMINOPHEN 650 MG: 325 TABLET ORAL at 02:29

## 2025-02-15 RX ADMIN — HYDROMORPHONE HYDROCHLORIDE 0.5 MG: 1 INJECTION, SOLUTION INTRAMUSCULAR; INTRAVENOUS; SUBCUTANEOUS at 04:38

## 2025-02-15 RX ADMIN — CARBIDOPA AND LEVODOPA 2 TABLET: 25; 100 TABLET ORAL at 15:12

## 2025-02-15 RX ADMIN — ATORVASTATIN CALCIUM 20 MG: 10 TABLET, FILM COATED ORAL at 09:48

## 2025-02-15 RX ADMIN — DOXAZOSIN MESYLATE 8 MG: 2 TABLET ORAL at 20:16

## 2025-02-15 RX ADMIN — ASPIRIN 81 MG: 81 TABLET, CHEWABLE ORAL at 09:48

## 2025-02-15 RX ADMIN — VANCOMYCIN HYDROCHLORIDE 1000 MG: 1 INJECTION, POWDER, LYOPHILIZED, FOR SOLUTION INTRAVENOUS at 11:49

## 2025-02-15 RX ADMIN — CEFEPIME 2000 MG: 2 INJECTION, POWDER, FOR SOLUTION INTRAVENOUS at 04:38

## 2025-02-15 RX ADMIN — VANCOMYCIN 1750 MG: 1.25 INJECTION, SOLUTION INTRAVENOUS at 00:12

## 2025-02-15 RX ADMIN — CARBIDOPA AND LEVODOPA 2 TABLET: 25; 100 TABLET ORAL at 17:52

## 2025-02-15 RX ADMIN — CARBIDOPA AND LEVODOPA 2 TABLET: 25; 100 TABLET ORAL at 06:31

## 2025-02-15 RX ADMIN — GABAPENTIN 300 MG: 300 CAPSULE ORAL at 20:16

## 2025-02-15 RX ADMIN — DULOXETINE HYDROCHLORIDE 60 MG: 60 CAPSULE, DELAYED RELEASE ORAL at 09:48

## 2025-02-15 RX ADMIN — VANCOMYCIN HYDROCHLORIDE 1000 MG: 1 INJECTION, POWDER, LYOPHILIZED, FOR SOLUTION INTRAVENOUS at 23:17

## 2025-02-15 ASSESSMENT — PAIN SCALES - GENERAL
PAINLEVEL_OUTOF10: 0
PAINLEVEL_OUTOF10: 0
PAINLEVEL_OUTOF10: 7
PAINLEVEL_OUTOF10: 3

## 2025-02-15 ASSESSMENT — PAIN DESCRIPTION - LOCATION
LOCATION: ABDOMEN
LOCATION: ABDOMEN

## 2025-02-15 ASSESSMENT — PAIN DESCRIPTION - DESCRIPTORS
DESCRIPTORS: ACHING;DISCOMFORT
DESCRIPTORS: ACHING;DISCOMFORT

## 2025-02-15 ASSESSMENT — PAIN SCALES - WONG BAKER: WONGBAKER_NUMERICALRESPONSE: NO HURT

## 2025-02-15 NOTE — PROGRESS NOTES
4 Eyes Skin Assessment     NAME:  Tj Barker  YOB: 1940  MEDICAL RECORD NUMBER:  [id number]  Patient has scattered bruising, no open areas noted and or reported. Suprapubic catheter in place. Patient hard or hearing, daughter at bedside  The patient is being assessed for  Admission    I agree that at least one RN has performed a thorough Head to Toe Skin Assessment on the patient. ALL assessment sites listed below have been assessed.      Areas assessed by both nurses:    Head, Face, Ears, Shoulders, Back, Chest, Arms, Elbows, Hands, Sacrum. Buttock, Coccyx, Ischium, and Legs. Feet and Heels        Does the Patient have a Wound? No noted wound(s)       Vini Prevention initiated by RN: No  Wound Care Orders initiated by RN: No    Pressure Injury (Stage 3,4, Unstageable, DTI, NWPT, and Complex wounds) if present, place Wound referral order by RN under : No    New Ostomies, if present place, Ostomy referral order under : No     Nurse 1 eSignature: Electronically signed by Shankar Vallejo RN on 2/15/25 at 6:23 AM EST    **SHARE this note so that the co-signing nurse can place an eSignature**    Nurse 2 eSignature: Electronically signed by Marika Byrd RN on 2/15/25 at 6:28 AM EST

## 2025-02-15 NOTE — PROGRESS NOTES
Inpatient Physical Therapy Evaluation & Treatment    [facility]  Date:  2/15/2025  Patient Name:    Tj Barker  Admitting diagnosis:  Acute pyelonephritis [N10]  Pitting edema [R60.9]  Complex renal cyst [N28.1]  Acute cystitis with hematuria [N30.01]  Admit Date:  2/14/2025  Precautions/Restrictions/WB Status/ Lines/ Wounds/ Oxygen: Fall risk, Bed/chair alarm, Lines (internal catheter), Telemetry, and Isolation Precautions: Contact    Pt seen for cotreatment this date due to patient safety, patient endurance, complexity of condition, acute illness/injury, staff recommendation, and need for the assistance of 2 skilled therapists    Treatment Time:  1500 - 1606  Treatment Number:  1   Timed Code Treatment Minutes: 56 minutes  Total Treatment Minutes:  66  minutes    Patient Stated Goals for Therapy: \" to go home \"          Discharge Recommendations: SNF  DME needs for discharge: Defer to facility       Therapy recommendation for EMS Transport: requires transport by cot due to pt needs lift equipment for safe transfers and pt needs A x 2 for safe transfers    Therapy recommendations for staff:   Assist of 1 for transfers with use of HINA STEDY and gait belt to/from BSC  to/from chair    History of Present Illness:   Per chart review, internal medicine note on 02/14/25:  \"84 y.o. male who presented to [facility] with past medical history of Parkinson disease, hypertension, hyperlipidemia, BPH with suprapubic catheter placed by Dr. Rivera with urology group 2 weeks ago.  Patient is receiving antibiotics he came today because has been voiding from the urethra instead of the suprapubic catheter.  CT obtained in the ED showing no obstructive uropathy but did noted to have renal cyst on prior imaging and today looks exophytic off the upper pole of left kidney with concern for hemorrhage or rupture.\"   Urology and nephrology consult pending     Preadmission Environment:   Pt. Lives

## 2025-02-15 NOTE — H&P
Hospital Medicine History & Physical      PCP: Mal Em MD    Date of Admission: 2/14/2025    Date of Service: Pt seen/examined on 2/14/2025    Pt seen/examined face to face on and admitted as inpatient with expected LOS to be two days but can change depending on diagnostic work up and treatment response.     Chief Complaint:    Chief Complaint   Patient presents with    Urinary Catheter            ASSESSMENT AND PLAN:    Active Hospital Problems    Diagnosis Date Noted    Acute cystitis with hematuria [N30.01] 02/14/2025     Sepsis on arrival to the ED: Fever, tachycardia, leukocytosis secondary to acute pyelonephritis, suprapubic localized catheter infection  IV vancomycin cefepime  Neurology consulted, appreciated  Pain control    Suprapubic catheter insertion showing erythema, drainage:  Antibiotics above    Pyelonephritis:  Antibiotic as above  Renal mass: Urology consulted appears to be cyst with concern for pyelonephritis or rupture  Antibiotic as above  Defer to urology, appreciated    PD: Continue medication  Hyperlipidemia: Controlled on home Statin. Outpatient PCP follow up post-discharge  Anxiety: Controlled, continue home medications      .Due to the above diagnosis makes the patient higher risk for morbidity and mortality requiring testing and treatment      Discussion with the primary ER physician in regards to symptoms, history, physical exam, diagnosis and treatment, collaborative decision was to admit the patient.    Diet: NPO except meds ordered    DVT Prophylaxis: Held pending consult    Dispo:   Expected LOS of two days      History Of Present Illness:      84 y.o. male who presented to [facility] with past medical history of Parkinson disease, hypertension, hyperlipidemia, BPH with suprapubic catheter placed by Dr. Rivera with urology group 2 weeks ago.  Patient is receiving antibiotics he came today because has been voiding from the urethra instead of the suprapubic catheter.

## 2025-02-15 NOTE — PLAN OF CARE
Problem: Discharge Planning  Goal: Discharge to home or other facility with appropriate resources  2/15/2025 1040 by Pat Grant RN  Outcome: Progressing  2/15/2025 0117 by Shankar Vallejo RN  Outcome: Progressing     Problem: Skin/Tissue Integrity  Goal: Skin integrity remains intact  Description: 1.  Monitor for areas of redness and/or skin breakdown  2.  Assess vascular access sites hourly  3.  Every 4-6 hours minimum:  Change oxygen saturation probe site  4.  Every 4-6 hours:  If on nasal continuous positive airway pressure, respiratory therapy assess nares and determine need for appliance change or resting period  2/15/2025 1040 by Pat Grant RN  Outcome: Progressing  2/15/2025 0117 by Shankar Vallejo RN  Outcome: Progressing     Problem: ABCDS Injury Assessment  Goal: Absence of physical injury  2/15/2025 1040 by Pat Grant RN  Outcome: Progressing  2/15/2025 0117 by Shankar Vallejo RN  Outcome: Progressing     Problem: Safety - Adult  Goal: Free from fall injury  2/15/2025 1040 by Pat Grant RN  Outcome: Progressing  2/15/2025 0117 by Shankar Vallejo RN  Outcome: Progressing      "Subjective:       Patient ID: Joyce Gonzalez is a 36 y.o. female.    Vitals:  height is 5' 2" (1.575 m) and weight is 81.6 kg (180 lb). Her oral temperature is 98.6 °F (37 °C). Her blood pressure is 137/83 and her pulse is 76. Her respiration is 18 and oxygen saturation is 96%.     Chief Complaint: Sinus Problem    37 yo female presents to urgent care for evaluation. She reports sinus congestion, low grade fever (Tmax 101) and fatigue x 2 weeks. She has taken 4 at home covid tests, all negative. The latest one was yesterday. She is covid vaccinated. She has tried mucinex and ibuprofen for her symptoms with mild relief. Denies fever, CP, SOB, abdominal sx, and other associated complaints.    Sinus Problem  This is a new problem. The current episode started 1 to 4 weeks ago. The problem is unchanged. There has been no fever. Associated symptoms include congestion and sinus pressure. Pertinent negatives include no chills, coughing, diaphoresis, ear pain, headaches, hoarse voice, neck pain, shortness of breath, sneezing, sore throat or swollen glands. Treatments tried: mucinex, ibuprofen. The treatment provided mild relief.       Constitution: Negative for chills, sweating, fatigue, fever and unexpected weight change.   HENT: Positive for congestion and sinus pressure. Negative for ear pain, ear discharge, sinus pain, sore throat and trouble swallowing.    Neck: Negative for neck pain and neck stiffness.   Cardiovascular: Negative for chest pain.   Eyes: Negative for eye pain.   Respiratory: Negative for cough, sputum production, shortness of breath and wheezing.    Gastrointestinal: Negative for abdominal pain, nausea and vomiting.   Musculoskeletal: Negative for muscle ache.   Allergic/Immunologic: Negative for sneezing.   Neurological: Negative for dizziness and headaches.       Objective:      Physical Exam   Constitutional: She is oriented to person, place, and time. She appears well-developed and well-nourished. " She is cooperative.  Non-toxic appearance. She does not have a sickly appearance. She does not appear ill. No distress.      Comments:Sitting comfortably in exam room, well appearing. No acute distress.     HENT:   Head: Normocephalic and atraumatic.   Ears:   Right Ear: Hearing, tympanic membrane, external ear and ear canal normal.   Left Ear: Hearing, tympanic membrane, external ear and ear canal normal.   Nose: No mucosal edema, rhinorrhea or nasal deformity. No epistaxis. Right sinus exhibits no maxillary sinus tenderness and no frontal sinus tenderness. Left sinus exhibits maxillary sinus tenderness. Left sinus exhibits no frontal sinus tenderness.   Mouth/Throat: Uvula is midline, oropharynx is clear and moist and mucous membranes are normal. No trismus in the jaw. Normal dentition. No uvula swelling. No oropharyngeal exudate, posterior oropharyngeal edema or posterior oropharyngeal erythema.   Eyes: Conjunctivae and lids are normal. No scleral icterus.   Neck: Trachea normal and phonation normal. Neck supple. No edema present. No erythema present. No neck rigidity present.   Cardiovascular: Normal rate, regular rhythm, normal heart sounds, intact distal pulses and normal pulses.   Pulmonary/Chest: Effort normal and breath sounds normal. No respiratory distress. She has no decreased breath sounds. She has no rhonchi.   Abdominal: Normal appearance.   Musculoskeletal: Normal range of motion.         General: No deformity or edema. Normal range of motion.   Neurological: She is alert and oriented to person, place, and time. She exhibits normal muscle tone. Coordination normal.   Skin: Skin is warm, dry, intact, not diaphoretic and not pale.   Psychiatric: She has a normal mood and affect. Her speech is normal and behavior is normal. Judgment and thought content normal. Cognition and memory  Nursing note and vitals reviewed.          Assessment:       1. Acute non-recurrent maxillary sinusitis    2. Encounter for  screening for COVID-19          Plan:         Acute non-recurrent maxillary sinusitis  -     amoxicillin-clavulanate 875-125mg (AUGMENTIN) 875-125 mg per tablet; Take 1 tablet by mouth every 12 (twelve) hours. for 7 days  Dispense: 14 tablet; Refill: 0    Encounter for screening for COVID-19         Discussed that given her 4 negative rapid tests at home, we will call with her PCR results. Advised patient that her symptoms are indicative of an upper respiratory infection which is viral in nature and should be treated symptomatically.  We discussed over-the-counter medications as well as home remedies to help with current symptoms.  We also discussed a wait and see antibiotic plan which the patient verbalized understanding.  Patient educational handouts also included in discharge paperwork for patient who verbalized understanding agrees with plan of care.  She denies any further questions or concerns at this time.  Patient exits exam room in no acute distress.            Patient Instructions   You have received a WASP (Wait and See Prescription) of antibiotics.  Usually this is in case your symptoms worsen (worsening fever, sinus pain, sore throat or ear pain).  The overall goal is to give our symptomatic treatment at least 48 -72 hrs to improve your symptoms.  If you do begin taking the antibiotics, please take them to completion.     Also be aware that antibiotics may interfere with your  Birth control. If you think you may be developing a yeast infection, use over the counter Monistat (if you are not allergic). You can also use probiotics to help with the side effects of the antibiotics.     Do not take these antibiotics for any other illnesses as they may be inappropriate and may lead to resistance.   We are working extremely hard to limit antibiotic use to prevent resistance.      I  PLEASE READ YOUR DISCHARGE INSTRUCTIONS ENTIRELY AS IT CONTAINS IMPORTANT INFORMATION.      Please drink plenty of  fluids.    Please get plenty of rest.    Please return here or go to the Emergency Department for any concerns or worsening of condition.    Please take an over the counter antihistamine medication (allegra/Claritin/Zyrtec) of your choice as directed for allergy symptoms and/or runny nose and postnasal drip.    Try an over the counter decongestant for sinus pressure/ear pressure, congestion symptoms like Mucinex D or Sudafed or Phenylephrine. You buy this behind the pharmacy counter    If you do have Hypertension or palpitations, it is safe to take Coricidin HBP for relief of sinus symptoms.    Tylenol or ibuprofen can also be used as directed for pain and fever unless you have an allergy to them or medical condition such as stomach ulcers, kidney or liver disease or blood thinners etc for which you should not be taking these type of medications.     Sore throat recommendations: Warm fluids, warm salt water gargles, throat lozenges, tea, honey, soup, rest, hydration.    Use over the counter flonase or nasocort: one spray each nostril twice daily OR two sprays each nostril once daily until nares dry out, unless you have Glaucoma.   Can also supplement with nasal saline rinse.    Sinus rinses DO NOT USE TAP WATER, if you must, water must be a rolling boil for 1 minute, let it cool, then use.  May use distilled water, or over the counter nasal saline rinses.  Vics vapor rub in shower to help open nasal passages.  May use nasal gel to keep passages moisturized.  May use Nasal saline sprays during the day for added relief of congestion.   For those who go to the gym, please do not use the sauna or steam room now to clear sinuses.      Cough     Rest and fluids are important  Can use honey with david to soothe your throat    Robitussin or Delsyum for cough suppressant for dry cough.    Mucinex DM or products containing Guaifenesin or Dextromethorphan for expectorant (wet cough).    Take prescription cough meds (pills) as  prescribed; take prescription cough syrup at night as needed for cough.  Do not take both the prescribed cough pills and syrup at the same time or within 6 hours of each other.  Do not take the cough syrup with any other sedative medication as it can can cause drowsiness. Do not operate any heavy machinery, drink or drive while taking the cough syrup.     Please follow up with your primary care doctor or specialist in the next 48-72hrs as needed and if no improvement    If you  smoke, please stop smoking.      Please return or see your primary care doctor if you develop new or worsening symptoms.     Please arrange follow up with your primary medical clinic as soon as possible. You must understand that you've received an Urgent Care treatment only and that you may be released before all of your medical problems are known or treated. You, the patient, will arrange for follow up as instructed. If your symptoms worsen or fail to improve you should go to the Emergency Room.

## 2025-02-15 NOTE — CONSULTS
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 Has PD. Had 16Fr IR placed SPT about 3 weeks ago. C/o rectal pain and poor drainage from spt.  Ct shows ascending UTI and right hydro.      Family History, Social History, Review of Systems:  Reviewed and agreed to as per chart    Vitals:  /66   Pulse 69   Temp 97.5 °F (36.4 °C) (Oral)   Resp 16   Ht 1.778 m (5' 10\")   Wt 71.2 kg (157 lb)   SpO2 98%   BMI 22.53 kg/m²   Temp  Av.3 °F (36.8 °C)  Min: 97.4 °F (36.3 °C)  Max: 100.8 °F (38.2 °C)    Intake/Output Summary (Last 24 hours) at 2/15/2025 1258  Last data filed at 2/15/2025 1040  Gross per 24 hour   Intake 1355.04 ml   Output --   Net 1355.04 ml         Physical:  Well developed, well nourished in no acute distress  Mood indicates no abnormalities. Pt doesn’t appear depressed  Orientated to time and place  Neck is supple, trachea is midline  Respiratory effort is normal  Cardiovascular show no extremity swelling  Abdomen no masses or hernias are palpated, there is no tenderness. Liver and Spleen appear normal.  Skin show no abnormal lesions  Lymph nodes are not palpated in the inguinal, neck, or axillary area.     Male :  Penis appears normal and circumcised  Urethral meatus is normal in size and location  Scrotum appears normal and both testicles appear normal in size and location  Sphincter has good tone  Anus is inspected. There are no perineal masses  Prostate is not examined,       Labs:  WBC:    Lab Results   Component Value Date/Time    WBC 10.7 02/15/2025 06:39 AM     Hemoglobin/Hematocrit:    Lab Results   Component Value Date/Time    HGB 12.0 02/15/2025 06:39 AM    HCT 35.1 02/15/2025 06:39 AM     BMP:    Lab Results   Component Value Date/Time     02/15/2025 06:39 AM    K 3.7 02/15/2025 06:39 AM    K 3.7 02/15/2025 06:39 AM     02/15/2025 06:39 AM    CO2 25 02/15/2025 06:39 AM

## 2025-02-15 NOTE — PROGRESS NOTES
Inpatient Occupational Therapy Evaluation & Treatment    Unit: [facility]  Date:  2/15/2025  Patient Name:    Tj Barker  Admitting diagnosis:  Acute pyelonephritis [N10]  Pitting edema [R60.9]  Complex renal cyst [N28.1]  Acute cystitis with hematuria [N30.01]  Admit Date:  2/14/2025  Precautions/Restrictions/WB Status/ Lines/ Wounds/ Oxygen: Fall risk, Bed/chair alarm, Lines (IV and suprapubic catheter), Telemetry, and Isolation Precautions: Contact    Pt seen for cotreatment this date due to patient safety, patient endurance, acute illness/injury, and need for the assistance of 2 skilled therapists    Treatment Time:  6632-5882  Treatment Number:  1  Timed Code Treatment Minutes: 76 minutes  Total Treatment Minutes:  66  minutes    Patient Goals for Therapy: \"to go home\"          Discharge Recommendations: SNF  DME needs for discharge: Defer to facility       Therapy recommendations for staff:   Assist of 1 for transfers with use of HINA STEDY and gait belt to/from BSC  to/from chair    History of Present Illness:   Per H&P of Dr Dante Bryant, DO 2/14/24  Chief complaint: Suprapubic catheter malfunction     \"Tj Barker is a 84 y.o. male with a past medical history notable for hypertension, hyperlipidemia, BPH, Parkinson's disease who presents to the emergency department with concerns for suprapubic catheter dysfunction.  States that he has suprapubic catheter placed for BPH with Dr. Bedolla through the urology group approximately 2 weeks ago.  The family member at bedside states that the patient had an episode where he spontaneously voided through his urethra as his depends was full.  Has had approximately 250 mL urinary output in the suprapubic catheter bag today.  Patient feels a full sensation in the suprapubic region.  States that while they did contact the urology clinic they waited until about 5 PM to do so and it was too late to be seen in the clinic and therefore they were referred to the emergency

## 2025-02-15 NOTE — PROGRESS NOTES
Progress Note    Admit Date:  2/14/2025    Chronic suprapubic catheter   Sepsis   Pyelonephritis   Left renal lesion concerning for hemorrhage or ruptured renal cyst     Urology consulted     Subjective:  Mr. Barker seen      SPC exchanged    Pt feels better    Objective:   No data found.     Vitals:    02/15/25 0600 02/15/25 0933 02/15/25 1020 02/15/25 1514   BP:  132/66 132/66 128/63   Pulse:  69  80   Resp:  16     Temp:  97.5 °F (36.4 °C)  98.1 °F (36.7 °C)   TempSrc:  Oral     SpO2:  98%  94%   Weight: 71.6 kg (157 lb 12.8 oz)  71.2 kg (157 lb)    Height:   1.778 m (5' 10\")           Intake/Output Summary (Last 24 hours) at 2/15/2025 1506  Last data filed at 2/15/2025 1040  Gross per 24 hour   Intake 1355.04 ml   Output --   Net 1355.04 ml       Physical Exam:  Gen: No distress. Alert.   Eyes: PERRL. No sclera icterus. No conjunctival injection.   ENT: No discharge. Pharynx clear.   Neck: No JVD.  Trachea midline.  Resp: No accessory muscle use. No crackles. No wheezes. No rhonchi.   CV: Regular rate. Regular rhythm. No murmur.  No rub. No edema.   Capillary Refill: Brisk,< 3 seconds   Peripheral Pulses: +2 palpable, equal bilaterally   GI: Non-tender. Non-distended.  Normal bowel sounds.     SPC +    Skin: Warm and dry. No nodule on exposed extremities. No rash on exposed extremities.   M/S: No cyanosis. No joint deformity. No clubbing.   Neuro: Awake. Grossly nonfocal    Psych: Oriented x 3. No anxiety or agitation.         Medications:  aspirin, 81 mg, Daily  atorvastatin, 20 mg, Daily  carbidopa-levodopa, 2 tablet, 5x daily  DULoxetine, 60 mg, Daily  gabapentin, 300 mg, QHS  sodium chloride flush, 10 mL, 2 times per day  cefepime, 2,000 mg, Q12H  vancomycin, 1,000 mg, Q12H      PRN Medications:  HYDROmorphone, 0.25 mg, Q3H PRN   Or  HYDROmorphone, 0.5 mg, Q3H PRN  sodium chloride flush, 10 mL, PRN  sodium chloride, , PRN  promethazine, 12.5 mg, Q6H PRN   Or  ondansetron, 4 mg, Q6H PRN  melatonin, 3 mg,

## 2025-02-15 NOTE — PROGRESS NOTES
Assessment completed. Pt does not express any pain or discomfort at this time. Respirations are even & easy. No complaints voiced. Pt denies needs at this time. SR up x 2, and bed in low position. Call light is within reach.    Per pt, ok to add daughter Gilma to emergency contact list. Gilma at bedside has pdf copy of DNR paperwork indicating that pt wishes to be a DNR-CC. Requested at this time that a paper copy be brought in by family, Gilma states her sister could bring it in tonight. MD notified.     Bedside Mobility Assessment Tool (BMAT):     Assessment Level 1- Sit and Shake    1. From a semi-reclined position, ask patient to sit up and rotate to a seated position at the side of the bed. Can use the bedrail.    2. Ask patient to reach out and grab your hand and shake making sure patient reaches across his/her midline.   Pass- Patient is able to come to a seated position, maintain core strength. Maintains seated balance while reaching across midline. Move on to Assessment Level 2.     Assessment Level 2- Stretch and Point   1. With patient in seated position at the side of the bed, have patient place both feet on the floor (or stool) with knees no higher than hips.    2. Ask patient to stretch one leg and straighten the knee, then bend the ankle/flex and point the toes. If appropriate, repeat with the other leg.   Fail- Patient is unable to complete task. Patient is MOBILITY LEVEL 2.     Assessment Level 3- Stand   1. Ask patient to elevate off the bed or chair (seated to standing) using an assistive device (cane, bedrail).    2. Patient should be able to raise buttocks off be and hold for a count of five. May repeat once.   Fail- Patient unable to demonstrate standing stability. Patient is MOBILITY LEVEL 3.     Assessment Level 4- Walk   1. Ask patient to march in place at bedside.    2. Then ask patient to advance step and return each foot. Some medical conditions may render a patient from stepping

## 2025-02-15 NOTE — CONSULTS
Pharmacy Note  Vancomycin Consult    Tj Barker is a 84 y.o. male started on Vancomycin for sepsis (7 days); consult received from Dr. Bryant to manage therapy. Also receiving the following antibiotics: cefepime.    Recent Labs     02/14/25  1750   BUN 23*   CREATININE 0.6*   WBC 12.5*       Estimated Creatinine Clearance: 89 mL/min (A) (based on SCr of 0.6 mg/dL (L)).    Goal Trough Level: 15-20 mcg/mL  Goal AUC: 400-600 mg/L    Assessment/Plan:  Will initiate Vancomycin with a one time loading dose of 1750 mg x1, followed by 1000 mg IV every 12 hours.    Per Pk-insight:  [id number]: 529 mg/L.hr  AUC24,ss: 531 mg/L.hr  Probability of AUC24 > 400: 91 %  Ctrough,ss: 16.2 mg/L  Probability of Ctrough,ss > 20: 22 %    Next trough: 2/16 @ 1100    Thank you for the consult.  Will continue to follow.    Cassandra Velarde, RosemaryD, [facility], 2/14/2025 11:45 PM

## 2025-02-15 NOTE — ED PROVIDER NOTES
THIS IS MY KARO SUPERVISORY AND SHARED VISIT NOTE:    I personally saw the patient and made/approved the management plan and take responsibility for the patient management.    Labs Reviewed   CBC WITH AUTO DIFFERENTIAL - Abnormal; Notable for the following components:       Result Value    WBC 12.5 (*)     Hemoglobin 12.8 (*)     Hematocrit 38.3 (*)     Neutrophils Absolute 10.6 (*)     Lymphocytes Absolute 0.9 (*)     All other components within normal limits   COMPREHENSIVE METABOLIC PANEL W/ REFLEX TO MG FOR LOW K - Abnormal; Notable for the following components:    Glucose 133 (*)     BUN 23 (*)     Creatinine 0.6 (*)     Albumin/Globulin Ratio 1.0 (*)     ALT <5 (*)     All other components within normal limits   URINALYSIS WITH REFLEX TO CULTURE - Abnormal; Notable for the following components:    Color, UA BROWN (*)     Bilirubin, Urine SMALL (*)     pH, Urine 8.5 (*)     Protein, UA >=300 (*)     Nitrite, Urine POSITIVE (*)     Leukocyte Esterase, Urine TRACE (*)     All other components within normal limits   MICROSCOPIC URINALYSIS - Abnormal; Notable for the following components:    Crystals, UA 3+ Triple Phos (*)     All other components within normal limits   TROPONIN - Abnormal; Notable for the following components:    Troponin, High Sensitivity 40 (*)     All other components within normal limits   COVID-19, FLU A/B, AND RSV COMBO   LACTATE, SEPSIS   PROCALCITONIN   BRAIN NATRIURETIC PEPTIDE   PROTEIN / CREATININE RATIO, URINE     CT ABDOMEN PELVIS W IV CONTRAST Additional Contrast? None   Preliminary Result   Moderate left hydronephrosis and hydroureter to the level of the bladder new   since prior exam.  No evidence of obstructing renal stone.  This may be   secondary to urinary tract infection or reflux.      33 x 27 x 25 mm hyperattenuating lesion exophytic off of the upper pole of   the left kidney. This is in the same location as an 18 x 13 mm simple cyst   seen on prior CT. Findings suggest

## 2025-02-16 PROBLEM — E78.5 HYPERLIPIDEMIA: Status: ACTIVE | Noted: 2025-02-16

## 2025-02-16 PROBLEM — G20.A1 PARKINSON'S DISEASE (HCC): Status: ACTIVE | Noted: 2025-02-16

## 2025-02-16 LAB
ALBUMIN SERPL-MCNC: 3 G/DL (ref 3.4–5)
ALBUMIN/GLOB SERPL: 1 {RATIO} (ref 1.1–2.2)
ALP SERPL-CCNC: 58 U/L (ref 40–129)
ALT SERPL-CCNC: <5 U/L (ref 10–40)
ANION GAP SERPL CALCULATED.3IONS-SCNC: 9 MMOL/L (ref 3–16)
AST SERPL-CCNC: 15 U/L (ref 15–37)
BASOPHILS # BLD: 0.1 K/UL (ref 0–0.2)
BASOPHILS NFR BLD: 0.5 %
BILIRUB SERPL-MCNC: <0.2 MG/DL (ref 0–1)
BUN SERPL-MCNC: 23 MG/DL (ref 7–20)
CALCIUM SERPL-MCNC: 8.2 MG/DL (ref 8.3–10.6)
CHLORIDE SERPL-SCNC: 105 MMOL/L (ref 99–110)
CO2 SERPL-SCNC: 25 MMOL/L (ref 21–32)
CREAT SERPL-MCNC: 0.7 MG/DL (ref 0.8–1.3)
CREAT UR-MCNC: 79.4 MG/DL (ref 39–259)
DEPRECATED RDW RBC AUTO: 14.6 % (ref 12.4–15.4)
EOSINOPHIL # BLD: 0.4 K/UL (ref 0–0.6)
EOSINOPHIL NFR BLD: 4.3 %
GFR SERPLBLD CREATININE-BSD FMLA CKD-EPI: 89 ML/MIN/{1.73_M2}
GLUCOSE SERPL-MCNC: 100 MG/DL (ref 70–99)
HCT VFR BLD AUTO: 33.4 % (ref 40.5–52.5)
HGB BLD-MCNC: 11.5 G/DL (ref 13.5–17.5)
LYMPHOCYTES # BLD: 1.2 K/UL (ref 1–5.1)
LYMPHOCYTES NFR BLD: 11.2 %
MAGNESIUM SERPL-MCNC: 1.79 MG/DL (ref 1.8–2.4)
MCH RBC QN AUTO: 28.7 PG (ref 26–34)
MCHC RBC AUTO-ENTMCNC: 34.5 G/DL (ref 31–36)
MCV RBC AUTO: 83.3 FL (ref 80–100)
MONOCYTES # BLD: 0.7 K/UL (ref 0–1.3)
MONOCYTES NFR BLD: 6.4 %
NEUTROPHILS # BLD: 8 K/UL (ref 1.7–7.7)
NEUTROPHILS NFR BLD: 77.6 %
PLATELET # BLD AUTO: 295 K/UL (ref 135–450)
PMV BLD AUTO: 6.8 FL (ref 5–10.5)
POTASSIUM SERPL-SCNC: 3.3 MMOL/L (ref 3.5–5.1)
POTASSIUM SERPL-SCNC: 3.3 MMOL/L (ref 3.5–5.1)
PROT SERPL-MCNC: 5.9 G/DL (ref 6.4–8.2)
PROT UR-MCNC: 68.9 MG/DL
PROT/CREAT UR-RTO: 0.9 MG/DL
RBC # BLD AUTO: 4.02 M/UL (ref 4.2–5.9)
SODIUM SERPL-SCNC: 139 MMOL/L (ref 136–145)
VANCOMYCIN TROUGH SERPL-MCNC: 16.3 UG/ML (ref 10–20)
WBC # BLD AUTO: 10.3 K/UL (ref 4–11)

## 2025-02-16 PROCEDURE — 6370000000 HC RX 637 (ALT 250 FOR IP)

## 2025-02-16 PROCEDURE — 2580000003 HC RX 258: Performed by: INTERNAL MEDICINE

## 2025-02-16 PROCEDURE — 6360000002 HC RX W HCPCS: Performed by: INTERNAL MEDICINE

## 2025-02-16 PROCEDURE — 83735 ASSAY OF MAGNESIUM: CPT

## 2025-02-16 PROCEDURE — 6370000000 HC RX 637 (ALT 250 FOR IP): Performed by: INTERNAL MEDICINE

## 2025-02-16 PROCEDURE — 36415 COLL VENOUS BLD VENIPUNCTURE: CPT

## 2025-02-16 PROCEDURE — 80202 ASSAY OF VANCOMYCIN: CPT

## 2025-02-16 PROCEDURE — 85025 COMPLETE CBC W/AUTO DIFF WBC: CPT

## 2025-02-16 PROCEDURE — 1200000000 HC SEMI PRIVATE

## 2025-02-16 PROCEDURE — 80053 COMPREHEN METABOLIC PANEL: CPT

## 2025-02-16 PROCEDURE — 2500000003 HC RX 250 WO HCPCS: Performed by: INTERNAL MEDICINE

## 2025-02-16 PROCEDURE — 99233 SBSQ HOSP IP/OBS HIGH 50: CPT | Performed by: INTERNAL MEDICINE

## 2025-02-16 RX ORDER — POLYETHYLENE GLYCOL 3350 17 G/17G
8.5 POWDER, FOR SOLUTION ORAL EVERY OTHER DAY
Status: DISCONTINUED | OUTPATIENT
Start: 2025-02-16 | End: 2025-02-17 | Stop reason: HOSPADM

## 2025-02-16 RX ORDER — FAMOTIDINE 20 MG/1
20 TABLET, FILM COATED ORAL NIGHTLY
Status: DISCONTINUED | OUTPATIENT
Start: 2025-02-16 | End: 2025-02-17 | Stop reason: HOSPADM

## 2025-02-16 RX ORDER — VANCOMYCIN 750 MG/150ML
750 INJECTION, SOLUTION INTRAVENOUS EVERY 12 HOURS
Status: DISCONTINUED | OUTPATIENT
Start: 2025-02-16 | End: 2025-02-16

## 2025-02-16 RX ORDER — POTASSIUM CHLORIDE 750 MG/1
40 TABLET, EXTENDED RELEASE ORAL DAILY
Status: DISCONTINUED | OUTPATIENT
Start: 2025-02-16 | End: 2025-02-17 | Stop reason: HOSPADM

## 2025-02-16 RX ORDER — CALCIUM CARBONATE 500 MG/1
500 TABLET, CHEWABLE ORAL 3 TIMES DAILY PRN
Status: DISCONTINUED | OUTPATIENT
Start: 2025-02-16 | End: 2025-02-17 | Stop reason: HOSPADM

## 2025-02-16 RX ADMIN — CARBIDOPA AND LEVODOPA 2 TABLET: 25; 100 TABLET ORAL at 10:58

## 2025-02-16 RX ADMIN — CARBIDOPA AND LEVODOPA 2 TABLET: 25; 100 TABLET ORAL at 05:24

## 2025-02-16 RX ADMIN — CEFEPIME 2000 MG: 2 INJECTION, POWDER, FOR SOLUTION INTRAVENOUS at 18:38

## 2025-02-16 RX ADMIN — POLYETHYLENE GLYCOL (3350) 8.5 G: 17 POWDER, FOR SOLUTION ORAL at 18:36

## 2025-02-16 RX ADMIN — CARBIDOPA AND LEVODOPA 2 TABLET: 25; 100 TABLET ORAL at 14:02

## 2025-02-16 RX ADMIN — GABAPENTIN 300 MG: 300 CAPSULE ORAL at 20:47

## 2025-02-16 RX ADMIN — CARBIDOPA AND LEVODOPA 2 TABLET: 25; 100 TABLET ORAL at 17:08

## 2025-02-16 RX ADMIN — SODIUM CHLORIDE, PRESERVATIVE FREE 10 ML: 5 INJECTION INTRAVENOUS at 20:51

## 2025-02-16 RX ADMIN — ANTACID TABLETS 500 MG: 500 TABLET, CHEWABLE ORAL at 05:24

## 2025-02-16 RX ADMIN — SODIUM CHLORIDE, PRESERVATIVE FREE 10 ML: 5 INJECTION INTRAVENOUS at 10:58

## 2025-02-16 RX ADMIN — POTASSIUM CHLORIDE 40 MEQ: 750 TABLET, EXTENDED RELEASE ORAL at 18:36

## 2025-02-16 RX ADMIN — CARBIDOPA AND LEVODOPA 2 TABLET: 25; 100 TABLET ORAL at 20:50

## 2025-02-16 RX ADMIN — DOXAZOSIN MESYLATE 8 MG: 2 TABLET ORAL at 20:47

## 2025-02-16 RX ADMIN — DULOXETINE HYDROCHLORIDE 60 MG: 60 CAPSULE, DELAYED RELEASE ORAL at 10:57

## 2025-02-16 RX ADMIN — ATORVASTATIN CALCIUM 20 MG: 10 TABLET, FILM COATED ORAL at 10:57

## 2025-02-16 RX ADMIN — SODIUM CHLORIDE 25 ML: 9 INJECTION, SOLUTION INTRAVENOUS at 12:39

## 2025-02-16 RX ADMIN — VANCOMYCIN 750 MG: 750 INJECTION, SOLUTION INTRAVENOUS at 12:40

## 2025-02-16 RX ADMIN — CEFEPIME 2000 MG: 2 INJECTION, POWDER, FOR SOLUTION INTRAVENOUS at 05:24

## 2025-02-16 RX ADMIN — ASPIRIN 81 MG: 81 TABLET, CHEWABLE ORAL at 10:57

## 2025-02-16 ASSESSMENT — PAIN SCALES - GENERAL: PAINLEVEL_OUTOF10: 0

## 2025-02-16 NOTE — CARE COORDINATION
Spoke with daughter Leona who states they think they have their father talked into SNF.    [facility], admissions team doesnot work weekend, unable to LVM.  [facility] - ref given to [facility].     1430 - received VM from [facility] that they can accept at [facility]. Updated pt and daughter at bedside. They have now changed their first choice to [facility], LVM with ref to [facility]. CM following.

## 2025-02-16 NOTE — PROGRESS NOTES
4 Eyes Skin Assessment     NAME:  Tj Barker  YOB: 1940  MEDICAL RECORD NUMBER:  [id number]  Patient has scattered bruising, suprabubic in place. Patent. Daughter at bedside  The patient is being assessed for  Other low vini    I agree that at least one RN has performed a thorough Head to Toe Skin Assessment on the patient. ALL assessment sites listed below have been assessed.      Areas assessed by both nurses:    Head, Face, Ears, Shoulders, Back, Chest, Arms, Elbows, Hands, Sacrum. Buttock, Coccyx, Ischium, and Legs. Feet and Heels        Does the Patient have a Wound? No noted wound(s)       Vini Prevention initiated by RN: No  Wound Care Orders initiated by RN: No    Pressure Injury (Stage 3,4, Unstageable, DTI, NWPT, and Complex wounds) if present, place Wound referral order by RN under : No    New Ostomies, if present place, Ostomy referral order under : No     Nurse 1 eSignature: Electronically signed by Shankar Vallejo RN on 2/15/25 at 9:49 PM EST    **SHARE this note so that the co-signing nurse can place an eSignature**    Nurse 2 eSignature: {Esignature:[id number]}

## 2025-02-16 NOTE — PROGRESS NOTES
Patient in bed resting with eyes closed, daughter at bedside. Medication administered per MD order, no adverse reactions noted and or reported. Suprapubic in place, patent. No pain and or discomfort noted and or reported. All safety precautions in place

## 2025-02-16 NOTE — PROGRESS NOTES
Urology Attending Progress Note      Subjective: Feels better today.    Vitals:  BP (!) 146/76   Pulse 89   Temp 97.9 °F (36.6 °C) (Oral)   Resp 17   Ht 1.778 m (5' 10\")   Wt 71.2 kg (157 lb)   SpO2 95%   BMI 22.53 kg/m²   Temp  Av °F (36.7 °C)  Min: 97.9 °F (36.6 °C)  Max: 98.1 °F (36.7 °C)    Intake/Output Summary (Last 24 hours) at 2025 1050  Last data filed at 2025 0453  Gross per 24 hour   Intake 120 ml   Output 2250 ml   Net -2130 ml       Exam: No acute distress  Urine clear    Labs:  WBC:    Lab Results   Component Value Date/Time    WBC 10.3 2025 06:20 AM     Hemoglobin/Hematocrit:    Lab Results   Component Value Date/Time    HGB 11.5 2025 06:20 AM    HCT 33.4 2025 06:20 AM     BMP:    Lab Results   Component Value Date/Time     2025 06:20 AM    K 3.3 2025 06:20 AM    K 3.3 2025 06:20 AM     2025 06:20 AM    CO2 25 2025 06:20 AM    BUN 23 2025 06:20 AM    CREATININE 0.7 2025 06:20 AM    CALCIUM 8.2 2025 06:20 AM    GFRAA >60 2016 09:48 AM    LABGLOM 89 2025 06:20 AM     PT/INR:    Lab Results   Component Value Date/Time    PROTIME 15.2 2025 07:45 AM    INR 1.17 2025 07:45 AM     PTT:  No results found for: \"APTT\"[APTT          Impression/Plan: 84-year-old status post exchange of blocked suprapubic tube.  -Continue appointment with Dr. Bedolla as scheduled  -Catheter draining well today.  Urology will sign off.  Thank you    VERA HEAD MD

## 2025-02-16 NOTE — PROGRESS NOTES
Progress Note    Admit Date:  2/14/2025    Chronic suprapubic catheter   Sepsis   Pyelonephritis   Left renal lesion concerning for hemorrhage or ruptured renal cyst     Urology consulted .    SPC exchanged .   Cultures growing proteus       Subjective:  Mr. Barker seen    SPC exchanged    Pt feels better.  Patient is in no distress.  No abdominal pain.  Vital signs are stable.    Plan of care was discussed in detail with patient and with patient's daughter at bedside.  Plan of care was also discussed with patient's nurse        Objective:   Patient Vitals for the past 4 hrs:   BP Temp Temp src Pulse Resp SpO2   02/16/25 1623 (!) 159/77 97.5 °F (36.4 °C) Oral 78 18 98 %        Vitals:    02/15/25 2000 02/16/25 0319 02/16/25 1057 02/16/25 1623   BP: 139/65 (!) 146/76 (!) 151/77 (!) 159/77   Pulse: 88 89 85 78   Resp: 17 17 18 18   Temp: 98 °F (36.7 °C) 97.9 °F (36.6 °C) 97.7 °F (36.5 °C) 97.5 °F (36.4 °C)   TempSrc: Oral Oral Oral Oral   SpO2: 100% 95% 96% 98%   Weight:       Height:              Intake/Output Summary (Last 24 hours) at 2/16/2025 1753  Last data filed at 2/16/2025 1739  Gross per 24 hour   Intake 1803.38 ml   Output 2850 ml   Net -1046.62 ml       Physical Exam:  Gen: No distress. Alert.   Eyes: PERRL. No sclera icterus. No conjunctival injection.   ENT: No discharge. Pharynx clear.   Neck: No JVD.  Trachea midline.  Resp: No accessory muscle use. No crackles. No wheezes. No rhonchi.   CV: Regular rate. Regular rhythm. No murmur.  No rub. No edema.   Capillary Refill: Brisk,< 3 seconds   Peripheral Pulses: +2 palpable, equal bilaterally   GI: Non-tender. Non-distended.  Normal bowel sounds.   SPC +  Skin: Warm and dry. No nodule on exposed extremities. No rash on exposed extremities.   M/S: No cyanosis. No joint deformity. No clubbing.   Neuro: Awake. Grossly nonfocal    Psych: Oriented x 3. No anxiety or agitation.         Medications:  vancomycin, 750 mg, Q12H  doxazosin, 8 mg, Nightly  aspirin, 81

## 2025-02-16 NOTE — FLOWSHEET NOTE
Pt A/Ox4. VSS. Denies pain. Pt unlabored; respirations even, regular, effortless. RA. No distress noted. Shift assessment complete. See flowsheet. AM med's given. See MAR. Denies needs at this time. Telemetry remains in place. Daughter at bedside. Alarm on.  Side rails 2/4. Bed in low position. Call light within reach.     Bedside Mobility Assessment Tool (BMAT):     Assessment Level 1- Sit and Shake    1. From a semi-reclined position, ask patient to sit up and rotate to a seated position at the side of the bed. Can use the bedrail.    2. Ask patient to reach out and grab your hand and shake making sure patient reaches across his/her midline.   Pass- Patient is able to come to a seated position, maintain core strength. Maintains seated balance while reaching across midline. Move on to Assessment Level 2.     Assessment Level 2- Stretch and Point   1. With patient in seated position at the side of the bed, have patient place both feet on the floor (or stool) with knees no higher than hips.    2. Ask patient to stretch one leg and straighten the knee, then bend the ankle/flex and point the toes. If appropriate, repeat with the other leg.   Fail- Patient is unable to complete task. Patient is MOBILITY LEVEL 2.       Mobility Level- 2      02/16/25 1057   Vital Signs   Temp 97.7 °F (36.5 °C)   Temp Source Oral   Pulse 85   Heart Rate Source Monitor   Respirations 18   BP (!) 151/77   MAP (Calculated) 102   BP Location Right upper arm   BP Method Automatic   Patient Position High fowlers   Pain Assessment   Pain Assessment None - Denies Pain   Pain Level 0   Oxygen Therapy   SpO2 96 %   Pulse Oximetry Type Intermittent   O2 Device None (Room air)

## 2025-02-16 NOTE — PROGRESS NOTES
Transferred care to DOROTHY Palomares. Face to face bedside report given, no need voiced at this time. Pt awake in bed. No signs of distress noted. Call light within reach. Denies needs.

## 2025-02-16 NOTE — PROGRESS NOTES
Vanco day 3  Labs stable, trough 16.3.  Will reduce slightly to 750mg Q12hrs and recheck 2/18 at 1100.  Carl Rodriguez RPH PharmD 2/16/2025 11:55 AM

## 2025-02-16 NOTE — PLAN OF CARE
Problem: Discharge Planning  Goal: Discharge to home or other facility with appropriate resources  2/15/2025 2148 by Shankar Vallejo RN  Outcome: Progressing  2/15/2025 1040 by Pat Grant RN  Outcome: Progressing     Problem: Skin/Tissue Integrity  Goal: Skin integrity remains intact  Description: 1.  Monitor for areas of redness and/or skin breakdown  2.  Assess vascular access sites hourly  3.  Every 4-6 hours minimum:  Change oxygen saturation probe site  4.  Every 4-6 hours:  If on nasal continuous positive airway pressure, respiratory therapy assess nares and determine need for appliance change or resting period  2/15/2025 2148 by Shankar Vallejo RN  Outcome: Progressing  2/15/2025 1040 by Pat Grant RN  Outcome: Progressing     Problem: ABCDS Injury Assessment  Goal: Absence of physical injury  2/15/2025 2148 by Shankar Vallejo RN  Outcome: Progressing  2/15/2025 1040 by Pat Grant RN  Outcome: Progressing     Problem: Safety - Adult  Goal: Free from fall injury  2/15/2025 2148 by Shankar Vallejo RN  Outcome: Progressing  2/15/2025 1040 by Pat Grant RN  Outcome: Progressing

## 2025-02-17 VITALS
RESPIRATION RATE: 16 BRPM | SYSTOLIC BLOOD PRESSURE: 168 MMHG | OXYGEN SATURATION: 96 % | HEIGHT: 70 IN | BODY MASS INDEX: 22.05 KG/M2 | TEMPERATURE: 97.7 F | WEIGHT: 154 LBS | DIASTOLIC BLOOD PRESSURE: 76 MMHG | HEART RATE: 75 BPM

## 2025-02-17 PROBLEM — E87.6 HYPOKALEMIA: Status: ACTIVE | Noted: 2025-02-17

## 2025-02-17 PROBLEM — R53.1 WEAKNESS: Status: ACTIVE | Noted: 2025-02-17

## 2025-02-17 LAB
ALBUMIN SERPL-MCNC: 3 G/DL (ref 3.4–5)
ALBUMIN/GLOB SERPL: 1.2 {RATIO} (ref 1.1–2.2)
ALP SERPL-CCNC: 60 U/L (ref 40–129)
ALT SERPL-CCNC: <5 U/L (ref 10–40)
ANION GAP SERPL CALCULATED.3IONS-SCNC: 9 MMOL/L (ref 3–16)
AST SERPL-CCNC: 17 U/L (ref 15–37)
BACTERIA UR CULT: ABNORMAL
BASOPHILS # BLD: 0.1 K/UL (ref 0–0.2)
BASOPHILS NFR BLD: 0.7 %
BILIRUB SERPL-MCNC: 0.3 MG/DL (ref 0–1)
BUN SERPL-MCNC: 14 MG/DL (ref 7–20)
CALCIUM SERPL-MCNC: 8.2 MG/DL (ref 8.3–10.6)
CHLORIDE SERPL-SCNC: 102 MMOL/L (ref 99–110)
CO2 SERPL-SCNC: 26 MMOL/L (ref 21–32)
CREAT SERPL-MCNC: 0.7 MG/DL (ref 0.8–1.3)
DEPRECATED RDW RBC AUTO: 14.5 % (ref 12.4–15.4)
EOSINOPHIL # BLD: 0.3 K/UL (ref 0–0.6)
EOSINOPHIL NFR BLD: 4 %
GFR SERPLBLD CREATININE-BSD FMLA CKD-EPI: 89 ML/MIN/{1.73_M2}
GLUCOSE SERPL-MCNC: 120 MG/DL (ref 70–99)
HCT VFR BLD AUTO: 34.8 % (ref 40.5–52.5)
HGB BLD-MCNC: 11.9 G/DL (ref 13.5–17.5)
LYMPHOCYTES # BLD: 1.2 K/UL (ref 1–5.1)
LYMPHOCYTES NFR BLD: 14.9 %
MCH RBC QN AUTO: 28.5 PG (ref 26–34)
MCHC RBC AUTO-ENTMCNC: 34.1 G/DL (ref 31–36)
MCV RBC AUTO: 83.6 FL (ref 80–100)
MONOCYTES # BLD: 0.7 K/UL (ref 0–1.3)
MONOCYTES NFR BLD: 8.6 %
NEUTROPHILS # BLD: 5.7 K/UL (ref 1.7–7.7)
NEUTROPHILS NFR BLD: 71.8 %
ORGANISM: ABNORMAL
PLATELET # BLD AUTO: 276 K/UL (ref 135–450)
PMV BLD AUTO: 6.8 FL (ref 5–10.5)
POTASSIUM SERPL-SCNC: 3.6 MMOL/L (ref 3.5–5.1)
POTASSIUM SERPL-SCNC: 3.6 MMOL/L (ref 3.5–5.1)
PROT SERPL-MCNC: 5.6 G/DL (ref 6.4–8.2)
RBC # BLD AUTO: 4.16 M/UL (ref 4.2–5.9)
SODIUM SERPL-SCNC: 137 MMOL/L (ref 136–145)
WBC # BLD AUTO: 7.9 K/UL (ref 4–11)

## 2025-02-17 PROCEDURE — 6370000000 HC RX 637 (ALT 250 FOR IP): Performed by: INTERNAL MEDICINE

## 2025-02-17 PROCEDURE — 2580000003 HC RX 258: Performed by: INTERNAL MEDICINE

## 2025-02-17 PROCEDURE — 36415 COLL VENOUS BLD VENIPUNCTURE: CPT

## 2025-02-17 PROCEDURE — 6360000002 HC RX W HCPCS: Performed by: INTERNAL MEDICINE

## 2025-02-17 PROCEDURE — 85025 COMPLETE CBC W/AUTO DIFF WBC: CPT

## 2025-02-17 PROCEDURE — 80053 COMPREHEN METABOLIC PANEL: CPT

## 2025-02-17 RX ORDER — NICOTINE 21 MG/24HR
1 PATCH, TRANSDERMAL 24 HOURS TRANSDERMAL DAILY PRN
DISCHARGE
Start: 2025-02-17

## 2025-02-17 RX ORDER — LEVOFLOXACIN 500 MG/1
500 TABLET, FILM COATED ORAL DAILY
DISCHARGE
Start: 2025-02-17 | End: 2025-02-21

## 2025-02-17 RX ORDER — DULOXETIN HYDROCHLORIDE 60 MG/1
60 CAPSULE, DELAYED RELEASE ORAL DAILY
DISCHARGE
Start: 2025-02-18

## 2025-02-17 RX ORDER — FAMOTIDINE 20 MG/1
20 TABLET, FILM COATED ORAL NIGHTLY
DISCHARGE
Start: 2025-02-17

## 2025-02-17 RX ORDER — CALCIUM CARBONATE 500 MG/1
500 TABLET, CHEWABLE ORAL 3 TIMES DAILY PRN
DISCHARGE
Start: 2025-02-17 | End: 2025-03-19

## 2025-02-17 RX ADMIN — ASPIRIN 81 MG: 81 TABLET, CHEWABLE ORAL at 09:10

## 2025-02-17 RX ADMIN — CEFEPIME 2000 MG: 2 INJECTION, POWDER, FOR SOLUTION INTRAVENOUS at 06:26

## 2025-02-17 RX ADMIN — CARBIDOPA AND LEVODOPA 2 TABLET: 25; 100 TABLET ORAL at 14:07

## 2025-02-17 RX ADMIN — DULOXETINE HYDROCHLORIDE 60 MG: 60 CAPSULE, DELAYED RELEASE ORAL at 09:10

## 2025-02-17 RX ADMIN — SODIUM CHLORIDE 25 ML: 9 INJECTION, SOLUTION INTRAVENOUS at 14:10

## 2025-02-17 RX ADMIN — CARBIDOPA AND LEVODOPA 2 TABLET: 25; 100 TABLET ORAL at 11:14

## 2025-02-17 RX ADMIN — CARBIDOPA AND LEVODOPA 2 TABLET: 25; 100 TABLET ORAL at 17:16

## 2025-02-17 RX ADMIN — CARBIDOPA AND LEVODOPA 2 TABLET: 25; 100 TABLET ORAL at 07:59

## 2025-02-17 RX ADMIN — CARBIDOPA AND LEVODOPA 2 TABLET: 25; 100 TABLET ORAL at 19:41

## 2025-02-17 RX ADMIN — POTASSIUM CHLORIDE 40 MEQ: 750 TABLET, EXTENDED RELEASE ORAL at 09:10

## 2025-02-17 RX ADMIN — CEFEPIME 2000 MG: 2 INJECTION, POWDER, FOR SOLUTION INTRAVENOUS at 14:11

## 2025-02-17 RX ADMIN — ATORVASTATIN CALCIUM 20 MG: 10 TABLET, FILM COATED ORAL at 09:10

## 2025-02-17 ASSESSMENT — PAIN SCALES - GENERAL: PAINLEVEL_OUTOF10: 0

## 2025-02-17 NOTE — PROGRESS NOTES
Transferred care to DOROTHY Lee. Face to face bedside report given, no need voiced at this time. Pt in bed with eyes closed.  Pt awake in bed.   No signs of distress noted.  Call light within reach.   Denies needs.

## 2025-02-17 NOTE — DISCHARGE INSTR - COC
Continuity of Care Form    Patient Name: Tj Barker   :  1940  MRN:  [id number]    Admit date:  2025  Discharge date:  2025    Code Status Order: DNR-CC   Advance Directives:   Advance Care Flowsheet Documentation             Admitting Physician:  Dante Bryant DO  PCP: Mal Em MD    Discharging Nurse: Rosa RAMIREZ  Discharging Hospital Unit/Room#: 0226/0226-01  Discharging Unit Phone Number: [phone]    Emergency Contact:   Extended Emergency Contact Information  Primary Emergency Contact: Yudelka Barker  Address: [address] [place] [address]  Home Phone: [phone]  Relation: Spouse  Secondary Emergency Contact: Gilma Newell  Mobile Phone: [phone]  Relation: Child  Preferred language: English   needed? No    Past Surgical History:  Past Surgical History:   Procedure Laterality Date    CT DRAINAGE VISCERAL PERCUTANEOUS  2025    CT DRAINAGE VISCERAL PERCUTANEOUS 2025 [facility] CT SCAN    INGUINAL HERNIA REPAIR Bilateral 6/3/2016    BILATERAL INGUINAL HERNIA REPAIR WITH MESH    OTHER SURGICAL HISTORY      skin cancer removed from back    OTHER SURGICAL HISTORY Right 6/3/2016    right spermatocelectomy       Immunization History:   Immunization History   Administered Date(s) Administered    COVID-19, MODERNA BLUE border, Primary or Immunocompromised, (age 12y+), IM, 100 mcg/0.5mL 2021, 2021, 2021    COVID-19, MODERNA Bivalent, (age 12y+), IM, 50 mcg/0.5 mL 10/25/2022       Active Problems:  Patient Active Problem List   Diagnosis Code    Bilateral inguinal hernia without obstruction or gangrene K40.20    Lipoma, 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

## 2025-02-17 NOTE — PLAN OF CARE
Problem: Discharge Planning  Goal: Discharge to home or other facility with appropriate resources  Outcome: Adequate for Discharge     Problem: Skin/Tissue Integrity  Goal: Skin integrity remains intact  Description: 1.  Monitor for areas of redness and/or skin breakdown  2.  Assess vascular access sites hourly  3.  Every 4-6 hours minimum:  Change oxygen saturation probe site  4.  Every 4-6 hours:  If on nasal continuous positive airway pressure, respiratory therapy assess nares and determine need for appliance change or resting period  Outcome: Adequate for Discharge     Problem: ABCDS Injury Assessment  Goal: Absence of physical injury  Outcome: Adequate for Discharge     Problem: Safety - Adult  Goal: Free from fall injury  Outcome: Adequate for Discharge

## 2025-02-17 NOTE — PROGRESS NOTES
Physician Progress Note      PATIENT:               CARLEY SAMUEL  CSN #:                  [id number]  :                       1940  ADMIT DATE:       2025 5:16 PM  DISCH DATE:  RESPONDING  PROVIDER #:        Diogenes Scott MD          QUERY TEXT:    Pt admitted with Sepsis and pyelonephritis.  Pt noted to have suprapubic   catheter placed by Dr. Rivera with urology group 2 weeks ago. Noted in the H&P   \"Sepsis on arrival to the ED: Fever, tachycardia, leukocytosis secondary to   acute pyelonephritis, suprapubic localized catheter infection\", Noted in the   Urology notes \"83 yo with early sepsis 2/2 UTI and malfunctioning SPT If   possible\", please document in the progress notes and discharge summary if you   are evaluating and / or treating any of the following:    The medical record reflects the following:  Risk Factors: Sepsis. BPH, suprapubic catheter  Clinical Indicators: BPH with suprapubic catheter placed by Dr. Rivera with   urology group 2 weeks ago.  Patient is receiving antibiotics he came today   because has been voiding from the urethra instead of the suprapubic catheter.    CT obtained in the ED showing no obstructive uropathy but did noted to have   renal cyst on prior imaging and today looks exophytic off the upper pole of   left kidney with concern for hemorrhage or rupture.exchanged the SPT for a   16fr sorto. About 1 L of fluid drained-some urine looked purulent  Treatment: exchanged the SPT for a 16fr sorto,Urology consult, cefepime, 2,000   mg, Q12H, vancomycin, 1,000 mg, Q12H  Options provided:  -- Sepsis related to suprapubic catheter  -- Sepsis unrelated to suprapubic catheter  -- Other - I will add my own diagnosis  -- Disagree - Not applicable / Not valid  -- Disagree - Clinically unable to determine / Unknown  -- Refer to Clinical Documentation Reviewer    PROVIDER RESPONSE TEXT:    This patient has sepsis related to suprapubic catheter.    Query created by: Yara Cunningham on

## 2025-02-17 NOTE — PLAN OF CARE
Problem: Discharge Planning  Goal: Discharge to home or other facility with appropriate resources  Recent Flowsheet Documentation  Taken 2/16/2025 2006 by Bertha White RN  Discharge to home or other facility with appropriate resources: Identify barriers to discharge with patient and caregiver  2/16/2025 1842 by Rosa Alvarez RN  Outcome: Progressing     Problem: Skin/Tissue Integrity  Goal: Skin integrity remains intact  Description: 1.  Monitor for areas of redness and/or skin breakdown  2.  Assess vascular access sites hourly  3.  Every 4-6 hours minimum:  Change oxygen saturation probe site  4.  Every 4-6 hours:  If on nasal continuous positive airway pressure, respiratory therapy assess nares and determine need for appliance change or resting period  Recent Flowsheet Documentation  Taken 2/17/2025 0121 by Bertha White RN  Skin Integrity Remains Intact: Monitor for areas of redness and/or skin breakdown  Taken 2/16/2025 2006 by Bertha White RN  Skin Integrity Remains Intact: Monitor for areas of redness and/or skin breakdown  2/16/2025 1842 by Rosa Alvarez RN  Outcome: Progressing     Problem: ABCDS Injury Assessment  Goal: Absence of physical injury  2/17/2025 0122 by Bertha White RN  Outcome: Progressing  2/16/2025 1842 by Rosa Alvarez RN  Outcome: Progressing     Problem: Safety - Adult  Goal: Free from fall injury  2/17/2025 0122 by Bertha White RN  Outcome: Progressing  2/16/2025 1842 by Rosa Alvarez RN  Outcome: Progressing

## 2025-02-17 NOTE — FLOWSHEET NOTE
Pt A/Ox4. VSS. Denies pain. Pt unlabored; respirations even, regular, effortless. RA. No distress noted. No edema present to BLE. Shift assessment complete. See flowsheet. AM med's given. See MAR. Pt bathed this AM by PCA. Refused to turn. SP cath patent & draining. Denies needs at this time. Alarm on. Side rails 2/4. Bed in low position. Call light within reach.     Bedside Mobility Assessment Tool (BMAT):     Assessment Level 1- Sit and Shake    1. From a semi-reclined position, ask patient to sit up and rotate to a seated position at the side of the bed. Can use the bedrail.    2. Ask patient to reach out and grab your hand and shake making sure patient reaches across his/her midline.   Pass- Patient is able to come to a seated position, maintain core strength. Maintains seated balance while reaching across midline. Move on to Assessment Level 2.     Assessment Level 2- Stretch and Point   1. With patient in seated position at the side of the bed, have patient place both feet on the floor (or stool) with knees no higher than hips.    2. Ask patient to stretch one leg and straighten the knee, then bend the ankle/flex and point the toes. If appropriate, repeat with the other leg.   Fail- Patient is unable to complete task. Patient is MOBILITY LEVEL 2.     Assessment Level 3- Stand   1. Ask patient to elevate off the bed or chair (seated to standing) using an assistive device (cane, bedrail).    2. Patient should be able to raise buttocks off be and hold for a count of five. May repeat once.   Fail- Patient unable to demonstrate standing stability. Patient is MOBILITY LEVEL 3.     Assessment Level 4- Walk   1. Ask patient to march in place at bedside.    2. Then ask patient to advance step and return each foot. Some medical conditions may render a patient from stepping backwards, use your best clinical judgement.   Fail- Patient not able to complete tasks OR requires use of assistive device. Patient is MOBILITY

## 2025-02-17 NOTE — PROGRESS NOTES
Progress Note    Admit Date:  2/14/2025    Chronic suprapubic catheter   Sepsis   Pyelonephritis   Left renal lesion concerning for hemorrhage or ruptured renal cyst     Urology consulted .    SPC exchanged .   Cultures growing proteus       Subjective:  Mr. Barker seen    SPC exchanged    Pt feels better.  Patient is in no distress.  No abdominal pain.  Vital signs are stable.    Plan of care was discussed in detail with patient and with patient's daughter at bedside.  Plan of care was also discussed with patient's nurse        Objective:   No data found.       Vitals:    02/16/25 1623 02/16/25 2000 02/17/25 0600 02/17/25 0900   BP: (!) 159/77 (!) 170/78 (!) 153/72 (!) 144/72   Pulse: 78 76 76 80   Resp: 18 18 16 16   Temp: 97.5 °F (36.4 °C) 98.4 °F (36.9 °C) 98.5 °F (36.9 °C) 97.9 °F (36.6 °C)   TempSrc: Oral Oral Oral Oral   SpO2: 98% 98% 97% 94%   Weight:   69.9 kg (154 lb)    Height:              Intake/Output Summary (Last 24 hours) at 2/17/2025 1441  Last data filed at 2/17/2025 1115  Gross per 24 hour   Intake 1335.44 ml   Output 1950 ml   Net -614.56 ml       Physical Exam:  Gen: No distress. Alert.   Eyes: PERRL. No sclera icterus. No conjunctival injection.   ENT: No discharge. Pharynx clear.   Neck: No JVD.  Trachea midline.  Resp: No accessory muscle use. No crackles. No wheezes. No rhonchi.   CV: Regular rate. Regular rhythm. No murmur.  No rub. No edema.   Capillary Refill: Brisk,< 3 seconds   Peripheral Pulses: +2 palpable, equal bilaterally   GI: Non-tender. Non-distended.  Normal bowel sounds.   SPC +  Skin: Warm and dry. No nodule on exposed extremities. No rash on exposed extremities.   M/S: No cyanosis. No joint deformity. No clubbing.   Neuro: Awake. Grossly nonfocal    Psych: Oriented x 3. No anxiety or agitation.         Medications:  cefepime, 2,000 mg, Q8H  polyethylene glycol, 8.5 g, Every Other Day  potassium chloride, 40 mEq, Daily  famotidine, 20 mg, Nightly  doxazosin, 8 mg,

## 2025-02-17 NOTE — PROGRESS NOTES
4 Eyes Skin Assessment     NAME:  Tj Barker  YOB: 1940  MEDICAL RECORD NUMBER:  [id number]    The patient is being assessed for  Other Discharge to SNF    I agree that at least one RN has performed a thorough Head to Toe Skin Assessment on the patient. ALL assessment sites listed below have been assessed.      Areas assessed by both nurses:    -Blanchable redness to bilateral heels & coccyx.  -scattered bruising & abrasions to BUE/BLE.  -Moist redness to scrotum, penis shaft  -dry, flaky skin    Head, Face, Ears, Shoulders, Back, Chest, Arms, Elbows, Hands, Sacrum. Buttock, Coccyx, Ischium, and Legs. Feet and Heels             Nurse 1 eSignature: Electronically signed by Rosa Alvarez RN on 2/17/25 at 6:29 PM EST    **SHARE this note so that the co-signing nurse can place an eSignature**    Nurse 2 eSignature: Electronically signed by Moraima Murry RN on 2/17/25 at 6:43 PM EST

## 2025-02-17 NOTE — CARE COORDINATION
[facility]. Precert started by TRISH.     1051- Precert approved thru 2/19

## 2025-02-17 NOTE — CARE COORDINATION
DISCHARGE ORDER  Date/Time 2025 2:44 PM  Completed by: Concetta Salazar RN, Case Management    Patient Name: Tj Barker    : 1940      Admit order Date and Status:25 IP  Noted discharge order. (verify MD's last order for status of admission/Traditional Medicare 3 [place] Inpatient qualifying stay required for SNF)    Confirmed discharge plan with:              Patient:  Yes              When pt confirms DC plan does any support person need to be contacted by CM Yes Daughter   Gilma               Discharge to Facility: [facility] phone number for staff giving report: [phone]   Pre-certification completed: yes. Able to accept today per [facility] Exemption Notification (HENS) completed: yes   Discharge orders and Continuity of Care faxed to facility:  [facility]      Transportation:               Medical Transport explained with choice list offered to pt/family.                Choice:(no preference)  Agency used: [employer]   time:   1830      Pt/family/Nursing/Facility aware of  time: 1830  Yes Names: Gilma Hylton Clayton, Dina, Rosa  Ambulance form completed:  yes:      Date Last IMM Given:     Comments:    Pt is being d/c'd to [facility] today. Pt's O2 sats are 94% on RA.    Discharge timeout done with Pt,RN,CM. All discharge needs and concerns addressed.    Discharging nurse to complete STEVO, reconcile AVS, and place final copy with patient's discharge packet. Discharging RN to ensure that written prescriptions for  Level II medications are sent with patient to the facility as per protocol.

## 2025-02-18 NOTE — PROGRESS NOTES
Carlos came, Pt leaving via stretcher to . Discharge documents and report given to carlos.  Patient has no IV. CP and PE completed by prior shift RN. No further needs at discharge. Pt leaving with his daughter and all his personal belongings.

## 2025-02-20 LAB
BACTERIA BLD CULT ORG #2: NORMAL
BACTERIA BLD CULT: NORMAL

## 2025-05-02 ENCOUNTER — APPOINTMENT (OUTPATIENT)
Dept: CT IMAGING | Age: 85
End: 2025-05-02
Payer: MEDICARE

## 2025-05-02 ENCOUNTER — HOSPITAL ENCOUNTER (EMERGENCY)
Age: 85
Discharge: HOME | End: 2025-05-02
Attending: STUDENT IN AN ORGANIZED HEALTH CARE EDUCATION/TRAINING PROGRAM
Payer: MEDICARE

## 2025-05-02 ENCOUNTER — ANCILLARY PROCEDURE (OUTPATIENT)
Dept: EMERGENCY DEPT | Age: 85
End: 2025-05-02
Attending: STUDENT IN AN ORGANIZED HEALTH CARE EDUCATION/TRAINING PROGRAM
Payer: MEDICARE

## 2025-05-02 VITALS
SYSTOLIC BLOOD PRESSURE: 153 MMHG | OXYGEN SATURATION: 99 % | RESPIRATION RATE: 16 BRPM | HEART RATE: 62 BPM | DIASTOLIC BLOOD PRESSURE: 75 MMHG | TEMPERATURE: 98.1 F

## 2025-05-02 DIAGNOSIS — T83.9XXA URINARY CATHETER COMPLICATION, INITIAL ENCOUNTER: Primary | ICD-10-CM

## 2025-05-02 DIAGNOSIS — T83.511A URINARY TRACT INFECTION ASSOCIATED WITH INDWELLING URETHRAL CATHETER, INITIAL ENCOUNTER: ICD-10-CM

## 2025-05-02 DIAGNOSIS — N39.0 URINARY TRACT INFECTION ASSOCIATED WITH INDWELLING URETHRAL CATHETER, INITIAL ENCOUNTER: ICD-10-CM

## 2025-05-02 LAB
ALBUMIN SERPL-MCNC: 3.1 G/DL (ref 3.4–5)
ALBUMIN/GLOB SERPL: 1 {RATIO} (ref 1.1–2.2)
ALP SERPL-CCNC: 76 U/L (ref 40–129)
ALT SERPL-CCNC: <5 U/L (ref 10–40)
AMORPH SED URNS QL MICRO: ABNORMAL /HPF
ANION GAP SERPL CALCULATED.3IONS-SCNC: 10 MMOL/L (ref 3–16)
AST SERPL-CCNC: 15 U/L (ref 15–37)
BACTERIA URNS QL MICRO: ABNORMAL /HPF
BASOPHILS # BLD: 0 K/UL (ref 0–0.2)
BASOPHILS NFR BLD: 0.7 %
BILIRUB SERPL-MCNC: <0.2 MG/DL (ref 0–1)
BILIRUB UR QL STRIP.AUTO: NEGATIVE
BUN SERPL-MCNC: 15 MG/DL (ref 7–20)
CALCIUM SERPL-MCNC: 9.1 MG/DL (ref 8.3–10.6)
CHLORIDE SERPL-SCNC: 103 MMOL/L (ref 99–110)
CLARITY UR: ABNORMAL
CO2 SERPL-SCNC: 26 MMOL/L (ref 21–32)
COLOR UR: ABNORMAL
CREAT SERPL-MCNC: 0.7 MG/DL (ref 0.8–1.3)
DEPRECATED RDW RBC AUTO: 17.2 % (ref 12.4–15.4)
EOSINOPHIL # BLD: 0.1 K/UL (ref 0–0.6)
EOSINOPHIL NFR BLD: 1.1 %
EPI CELLS #/AREA URNS HPF: ABNORMAL /HPF (ref 0–5)
GFR SERPLBLD CREATININE-BSD FMLA CKD-EPI: 89 ML/MIN/{1.73_M2}
GLUCOSE SERPL-MCNC: 153 MG/DL (ref 70–99)
GLUCOSE UR STRIP.AUTO-MCNC: NEGATIVE MG/DL
HCT VFR BLD AUTO: 36.9 % (ref 40.5–52.5)
HGB BLD-MCNC: 12.3 G/DL (ref 13.5–17.5)
HGB UR QL STRIP.AUTO: ABNORMAL
KETONES UR STRIP.AUTO-MCNC: NEGATIVE MG/DL
LEUKOCYTE ESTERASE UR QL STRIP.AUTO: ABNORMAL
LIPASE SERPL-CCNC: 25 U/L (ref 13–60)
LYMPHOCYTES # BLD: 1 K/UL (ref 1–5.1)
LYMPHOCYTES NFR BLD: 14.6 %
MCH RBC QN AUTO: 27.8 PG (ref 26–34)
MCHC RBC AUTO-ENTMCNC: 33.5 G/DL (ref 31–36)
MCV RBC AUTO: 83.1 FL (ref 80–100)
MONOCYTES # BLD: 0.7 K/UL (ref 0–1.3)
MONOCYTES NFR BLD: 9.9 %
MUCOUS THREADS #/AREA URNS LPF: ABNORMAL /LPF
NEUTROPHILS # BLD: 4.9 K/UL (ref 1.7–7.7)
NEUTROPHILS NFR BLD: 73.7 %
NITRITE UR QL STRIP.AUTO: NEGATIVE
PH UR STRIP.AUTO: 8.5 [PH] (ref 5–8)
PLATELET # BLD AUTO: 241 K/UL (ref 135–450)
PMV BLD AUTO: 6.7 FL (ref 5–10.5)
POTASSIUM SERPL-SCNC: 3.6 MMOL/L (ref 3.5–5.1)
PROT SERPL-MCNC: 6.1 G/DL (ref 6.4–8.2)
PROT UR STRIP.AUTO-MCNC: ABNORMAL MG/DL
RBC # BLD AUTO: 4.43 M/UL (ref 4.2–5.9)
RBC #/AREA URNS HPF: ABNORMAL /HPF (ref 0–4)
SODIUM SERPL-SCNC: 139 MMOL/L (ref 136–145)
SP GR UR STRIP.AUTO: 1.01 (ref 1–1.03)
UA COMPLETE W REFLEX CULTURE PNL UR: YES
UA DIPSTICK W REFLEX MICRO PNL UR: YES
URN SPEC COLLECT METH UR: ABNORMAL
UROBILINOGEN UR STRIP-ACNC: 0.2 E.U./DL
WBC # BLD AUTO: 6.7 K/UL (ref 4–11)
WBC #/AREA URNS HPF: ABNORMAL /HPF (ref 0–5)

## 2025-05-02 PROCEDURE — 81001 URINALYSIS AUTO W/SCOPE: CPT

## 2025-05-02 PROCEDURE — 87077 CULTURE AEROBIC IDENTIFY: CPT

## 2025-05-02 PROCEDURE — 80053 COMPREHEN METABOLIC PANEL: CPT

## 2025-05-02 PROCEDURE — 85025 COMPLETE CBC W/AUTO DIFF WBC: CPT

## 2025-05-02 PROCEDURE — 2580000003 HC RX 258: Performed by: PHYSICIAN ASSISTANT

## 2025-05-02 PROCEDURE — 6360000004 HC RX CONTRAST MEDICATION: Performed by: PHYSICIAN ASSISTANT

## 2025-05-02 PROCEDURE — 51798 US URINE CAPACITY MEASURE: CPT

## 2025-05-02 PROCEDURE — 96375 TX/PRO/DX INJ NEW DRUG ADDON: CPT

## 2025-05-02 PROCEDURE — 83690 ASSAY OF LIPASE: CPT

## 2025-05-02 PROCEDURE — 74177 CT ABD & PELVIS W/CONTRAST: CPT

## 2025-05-02 PROCEDURE — 87186 SC STD MICRODIL/AGAR DIL: CPT

## 2025-05-02 PROCEDURE — 87086 URINE CULTURE/COLONY COUNT: CPT

## 2025-05-02 PROCEDURE — 96365 THER/PROPH/DIAG IV INF INIT: CPT

## 2025-05-02 PROCEDURE — 51705 CHANGE OF BLADDER TUBE: CPT

## 2025-05-02 PROCEDURE — 99285 EMERGENCY DEPT VISIT HI MDM: CPT

## 2025-05-02 PROCEDURE — 6360000002 HC RX W HCPCS: Performed by: PHYSICIAN ASSISTANT

## 2025-05-02 RX ORDER — IOPAMIDOL 755 MG/ML
75 INJECTION, SOLUTION INTRAVASCULAR
Status: COMPLETED | OUTPATIENT
Start: 2025-05-02 | End: 2025-05-02

## 2025-05-02 RX ORDER — 0.9 % SODIUM CHLORIDE 0.9 %
1000 INTRAVENOUS SOLUTION INTRAVENOUS ONCE
Status: COMPLETED | OUTPATIENT
Start: 2025-05-02 | End: 2025-05-02

## 2025-05-02 RX ORDER — KETOROLAC TROMETHAMINE 15 MG/ML
15 INJECTION, SOLUTION INTRAMUSCULAR; INTRAVENOUS ONCE
Status: COMPLETED | OUTPATIENT
Start: 2025-05-02 | End: 2025-05-02

## 2025-05-02 RX ORDER — WATER 10 ML/10ML
INJECTION INTRAMUSCULAR; INTRAVENOUS; SUBCUTANEOUS
Status: DISCONTINUED
Start: 2025-05-02 | End: 2025-05-02 | Stop reason: HOSPADM

## 2025-05-02 RX ADMIN — SODIUM CHLORIDE 1000 MG: 9 INJECTION, SOLUTION INTRAVENOUS at 16:51

## 2025-05-02 RX ADMIN — KETOROLAC TROMETHAMINE 15 MG: 15 INJECTION, SOLUTION INTRAMUSCULAR; INTRAVENOUS at 15:08

## 2025-05-02 RX ADMIN — SODIUM CHLORIDE 1000 ML: 0.9 INJECTION, SOLUTION INTRAVENOUS at 12:57

## 2025-05-02 RX ADMIN — IOPAMIDOL 75 ML: 755 INJECTION, SOLUTION INTRAVENOUS at 14:50

## 2025-05-02 ASSESSMENT — PAIN SCALES - GENERAL: PAINLEVEL_OUTOF10: 3

## 2025-05-02 ASSESSMENT — PAIN DESCRIPTION - LOCATION: LOCATION: ABDOMEN

## 2025-05-02 ASSESSMENT — PAIN - FUNCTIONAL ASSESSMENT: PAIN_FUNCTIONAL_ASSESSMENT: NONE - DENIES PAIN

## 2025-05-02 NOTE — ED PROVIDER NOTES
I independently performed a history and physical on Tj Barker.     I have discussed the case with the KARO/resident at 1500 and approve / take responsibility for the initial management plan and anticipated disposition as documented below.     In summary the patient presents with concern for poor output from suprapubic catheter on a background of chronic urinary obstruction.  The patient has had some leakage around the catheter and is also via the penis.  He has no other acute complaints.  He has had recurrent difficulties with sediment buildup in the catheter compromising his performance.  On my assessment the patient is afebrile and hemodynamically stable in no acute distress breath sounds are clear abdomen is soft nontender nondistended extremities are warm well-perfused.  There is little suprapubic tenderness.  There is a suprapubic catheter in place draining some small volume of clear yellow urine though there appears to be some sediment as well.  No evidence of hematuria.  Urine studies have been sent and could raise concern for possibility of UTI I have low clinical suspicion given the remainder of his workup and clinical condition but we will treat empirically.  Otherwise given difficulties with performance with verbal consent from patient and family bedside I conducted a point-of-care ultrasound of the bladder to clearly the condition of the balloon which appears inflated and intact.  I was able to decompress the balloon with a syringe returning about a milliliters of presumably sterile water after which under sterile conditions having prepared the region of the stoma with iodine I was able to remove the current catheter with some mild resistance at the stoma resulting in some mild bleeding however I was able to subsequently insert a comparable size suprapubic Padilla catheter with no resistance into good position and the balloon was inflated with 10 mL of sterile water patient tolerated well there were  no significant complications again there was some mild bleeding at the stoma site at the level of the skin.  There was good urine return without overt evidence of hematuria I have low clinical suspicion for bleeding within the bladder.  The patient tolerated well again there were no complications.  The Padilla was secured per nursing staff the remainder of the patient's workup was benign and he was subsequently discharged in stable condition.    I interpreted the following studies:    POC US POST VOID RESIDUAL  Result Date: 5/2/2025  POCUS_Bladder_Retention     Exam Information:          Exam type:  Clinically indicated     Indication(s) for Exam:          The exam was performed with the following indications::  Decreased urine output     Views Obtained & Images Saved for These Views:                  Transverse view         Sagittal (longitudinal) view     Procedure Performed:          Procedures:  supra pubic catheter exchange     Interpretation:          Urinary Retention         Other:  Likely catheter dysfunction     Confirmatory study:          What confirmatory study was done?:  Not applicable  Electronically signed by Gregory Nava (Nathan)  on Friday, May 2, 2025 at 5:32 PM : Attending: Gregory Nava (Nathan)        I personally discussed the patient's management with the following:  emory         For further details of Tj YAMIL Mj's emergency department encounter, please see the KARO/resident's documentation. Please note the signature time recorded here indicates the limit of my supervision of this case and should the patient require further management prior to disposition I have signed the case out to my colleague Anurag Lindsey MD  05/02/25 2068

## 2025-05-02 NOTE — ED NOTES
Suprapubic catheter was flushed, resistance met, amount that was flushed would return. Catheter did begin to drain more than patient has had since admission. Previous out upon arrival and till flush was approximately 30 ml. Approximate 50 ml in catheter now but patient's daughter states that he is having increased pain and urine coming out of the penis. Provider notified.

## 2025-05-02 NOTE — ED NOTES
Patient and daughter concerned about catheter being changed due to previous traumatic catheterization. Provider aware and will call urology.

## 2025-05-02 NOTE — DISCHARGE INSTRUCTIONS
You can use OTC Tylenol every 4-6 hours as needed for pain control.  Take medications as prescribed.  Follow-up with your primary care provider as well as urologist.

## 2025-05-02 NOTE — ED NOTES
6916 - Leigh Helton called and spoke with Ever Kelly PA-C at this time to complete the Urology consult

## 2025-05-04 ENCOUNTER — RESULTS FOLLOW-UP (OUTPATIENT)
Dept: EMERGENCY DEPT | Age: 85
End: 2025-05-04

## 2025-05-04 LAB
BACTERIA UR CULT: ABNORMAL
ORGANISM: ABNORMAL

## 2025-05-06 NOTE — ED PROVIDER NOTES
[facility] Emergency Department    CHIEF COMPLAINT  Urinary Catheter Issues (Patient arrives via EMS with complaints of urinary catheter not draining and leaking around the catheter.)      SHARED SERVICE VISIT  I have seen and evaluated this patient with my supervising physician, Dr. Anurag Nava.    HISTORY OF PRESENT ILLNESS  Tj Barker is a 84 y.o. male who presents to the ED complaining of urinary catheter issues.  He has a suprapubic catheter that was placed by urology several months ago.  Over the past 24 hours he has noticed some leakage around the catheter as well as his penis.  Does have a history of chronic urinary obstruction and does routinely see urology.  Did not call his urologist prior to arrival today.  He has had recurrent difficulties with sediment buildup in the catheter compromising his performance.  He is experiencing some suprapubic abdominal tenderness with no nausea or vomiting.  Has not noticed any blood in his catheter bag.  Denies any headache, body ache, fevers or chills.  Denies any coughing or sneezing.  Denies any sore throat or congestion.  Denies any vision changes or dizziness.  Denies any chest pain, shortness of breath, or dyspnea on exertion. Denies any diarrhea or bloody stools.  Denies any new onset back pain.  Denies any recent travel or sick contacts.    No other complaints, modifying factors or associated symptoms.     Nursing notes reviewed.   Past Medical History:   Diagnosis Date    BPH (benign prostatic hyperplasia)     Cancer (HCC)     skin    Hyperlipidemia     Hypertension     Parkinson disease (HCC)      Past Surgical History:   Procedure Laterality Date    CT DRAINAGE VISCERAL PERCUTANEOUS  1/27/2025    CT [facility]RA[facility]    INGUINAL HERNIA REPAIR Bilateral 6/3/2016    BILATERAL INGUINAL HERNIA REPAIR WITH MESH    OTHER SURGICAL HISTORY      skin cancer removed from back    OTHER SURGICAL

## 2025-06-03 RX ORDER — SULFAMETHOXAZOLE AND TRIMETHOPRIM 800; 160 MG/1; MG/1
1 TABLET ORAL DAILY
COMMUNITY

## 2025-06-03 NOTE — PROGRESS NOTES

## 2025-06-15 ENCOUNTER — ANESTHESIA EVENT (OUTPATIENT)
Dept: OPERATING ROOM | Age: 85
End: 2025-06-15
Payer: MEDICARE

## 2025-06-15 NOTE — ANESTHESIA PRE PROCEDURE
Department of Anesthesiology  Preprocedure Note       Name:  Tj Barker   Age:  84 y.o.  :  1940                                          MRN:  [id number]         Date:  6/15/2025      Surgeon: Surgeon(s):  Aneudy Hawthorne MD    Procedure: Procedure(s):  CYSTOSCOPY SUPRAPUBIC CATHETER CHANGE TO 18 FRENCH    Medications prior to admission:   Prior to Admission medications    Medication Sig Start Date End Date Taking? Authorizing Provider   sulfamethoxazole-trimethoprim (BACTRIM DS;SEPTRA DS) 800-160 MG per tablet Take 1 tablet by mouth daily   Yes Vern Dean MD   DULoxetine (CYMBALTA) 60 MG extended release capsule Take 1 capsule by mouth daily 25   Diogenes Scott MD   melatonin 3 MG TABS tablet Take 1 tablet by mouth nightly as needed (insomnia)  Patient not taking: Reported on 6/3/2025 2/17/25   Diogenes Scott MD   famotidine (PEPCID) 20 MG tablet Take 1 tablet by mouth at bedtime 25   Diogenes Scott MD   nicotine (NICODERM CQ) 21 MG/24HR Place 1 patch onto the skin daily as needed (dc if patient is a nonsmoker) 25   Diogenes Scott MD   polyethylene glycol (GLYCOLAX) 17 g packet Take 0.5 packets by mouth every other day    Vern Dean MD   carbidopa-levodopa (SINEMET)  MG per tablet Take 2 tablets by mouth 5 (five) times a day 7am  11am  2pm  5pm  8pm  2 tabs each    Vern Dean MD   gabapentin (NEURONTIN) 300 MG capsule Take 1 capsule by mouth nightly.    Vern Dean MD   doxazosin (CARDURA) 8 MG tablet Take 1 tablet by mouth nightly    Vern Dean MD   aspirin 81 MG tablet Take 1 tablet by mouth daily    Vern Dean MD   atorvastatin (LIPITOR) 20 MG tablet Take 1 tablet by mouth daily    Vern Dean MD       Current medications:    No current facility-administered medications for this encounter.     Current Outpatient Medications   Medication Sig Dispense Refill    sulfamethoxazole-trimethoprim

## 2025-06-16 NOTE — PROGRESS NOTES
Spoke with caregiver unable to come in early, pt coming with medical transport service.Nadira Mccarty RN

## 2025-06-17 ENCOUNTER — HOSPITAL ENCOUNTER (OUTPATIENT)
Age: 85
Setting detail: OUTPATIENT SURGERY
Discharge: HOME | End: 2025-06-17
Attending: UROLOGY | Admitting: UROLOGY
Payer: MEDICARE

## 2025-06-17 ENCOUNTER — ANESTHESIA (OUTPATIENT)
Dept: OPERATING ROOM | Age: 85
End: 2025-06-17
Payer: MEDICARE

## 2025-06-17 VITALS
TEMPERATURE: 98 F | HEIGHT: 70 IN | WEIGHT: 154 LBS | OXYGEN SATURATION: 94 % | DIASTOLIC BLOOD PRESSURE: 87 MMHG | HEART RATE: 92 BPM | SYSTOLIC BLOOD PRESSURE: 149 MMHG | BODY MASS INDEX: 22.05 KG/M2 | RESPIRATION RATE: 16 BRPM

## 2025-06-17 DIAGNOSIS — R33.9 RETENTION OF URINE: ICD-10-CM

## 2025-06-17 PROCEDURE — C1769 GUIDE WIRE: HCPCS | Performed by: UROLOGY

## 2025-06-17 PROCEDURE — 3600000014 HC SURGERY LEVEL 4 ADDTL 15MIN: Performed by: UROLOGY

## 2025-06-17 PROCEDURE — 2580000003 HC RX 258

## 2025-06-17 PROCEDURE — 88305 TISSUE EXAM BY PATHOLOGIST: CPT

## 2025-06-17 PROCEDURE — 2580000003 HC RX 258: Performed by: UROLOGY

## 2025-06-17 PROCEDURE — 7100000010 HC PHASE II RECOVERY - FIRST 15 MIN: Performed by: UROLOGY

## 2025-06-17 PROCEDURE — 6370000000 HC RX 637 (ALT 250 FOR IP): Performed by: ANESTHESIOLOGY

## 2025-06-17 PROCEDURE — 6360000002 HC RX W HCPCS

## 2025-06-17 PROCEDURE — 3600000004 HC SURGERY LEVEL 4 BASE: Performed by: UROLOGY

## 2025-06-17 PROCEDURE — 7100000011 HC PHASE II RECOVERY - ADDTL 15 MIN: Performed by: UROLOGY

## 2025-06-17 PROCEDURE — 3700000000 HC ANESTHESIA ATTENDED CARE: Performed by: UROLOGY

## 2025-06-17 PROCEDURE — 7100000001 HC PACU RECOVERY - ADDTL 15 MIN: Performed by: UROLOGY

## 2025-06-17 PROCEDURE — 2500000003 HC RX 250 WO HCPCS: Performed by: UROLOGY

## 2025-06-17 PROCEDURE — 3700000001 HC ADD 15 MINUTES (ANESTHESIA): Performed by: UROLOGY

## 2025-06-17 PROCEDURE — 7100000000 HC PACU RECOVERY - FIRST 15 MIN: Performed by: UROLOGY

## 2025-06-17 PROCEDURE — 6360000002 HC RX W HCPCS: Performed by: UROLOGY

## 2025-06-17 PROCEDURE — 2709999900 HC NON-CHARGEABLE SUPPLY: Performed by: UROLOGY

## 2025-06-17 RX ORDER — SODIUM CHLORIDE 0.9 % (FLUSH) 0.9 %
5-40 SYRINGE (ML) INJECTION EVERY 12 HOURS SCHEDULED
Status: DISCONTINUED | OUTPATIENT
Start: 2025-06-17 | End: 2025-06-17 | Stop reason: HOSPADM

## 2025-06-17 RX ORDER — MEPERIDINE HYDROCHLORIDE 25 MG/ML
12.5 INJECTION INTRAMUSCULAR; INTRAVENOUS; SUBCUTANEOUS EVERY 5 MIN PRN
Status: DISCONTINUED | OUTPATIENT
Start: 2025-06-17 | End: 2025-06-17 | Stop reason: HOSPADM

## 2025-06-17 RX ORDER — LABETALOL HYDROCHLORIDE 5 MG/ML
5 INJECTION, SOLUTION INTRAVENOUS EVERY 10 MIN PRN
Status: DISCONTINUED | OUTPATIENT
Start: 2025-06-17 | End: 2025-06-17 | Stop reason: HOSPADM

## 2025-06-17 RX ORDER — SODIUM CHLORIDE, SODIUM LACTATE, POTASSIUM CHLORIDE, CALCIUM CHLORIDE 600; 310; 30; 20 MG/100ML; MG/100ML; MG/100ML; MG/100ML
INJECTION, SOLUTION INTRAVENOUS
Status: DISCONTINUED | OUTPATIENT
Start: 2025-06-17 | End: 2025-06-17 | Stop reason: SDUPTHER

## 2025-06-17 RX ORDER — PROPOFOL 10 MG/ML
INJECTION, EMULSION INTRAVENOUS
Status: DISCONTINUED | OUTPATIENT
Start: 2025-06-17 | End: 2025-06-17 | Stop reason: SDUPTHER

## 2025-06-17 RX ORDER — NALOXONE HYDROCHLORIDE 0.4 MG/ML
INJECTION, SOLUTION INTRAMUSCULAR; INTRAVENOUS; SUBCUTANEOUS PRN
Status: DISCONTINUED | OUTPATIENT
Start: 2025-06-17 | End: 2025-06-17 | Stop reason: HOSPADM

## 2025-06-17 RX ORDER — SODIUM CHLORIDE 9 MG/ML
INJECTION, SOLUTION INTRAVENOUS PRN
Status: DISCONTINUED | OUTPATIENT
Start: 2025-06-17 | End: 2025-06-17 | Stop reason: HOSPADM

## 2025-06-17 RX ORDER — ONDANSETRON 2 MG/ML
4 INJECTION INTRAMUSCULAR; INTRAVENOUS
Status: DISCONTINUED | OUTPATIENT
Start: 2025-06-17 | End: 2025-06-17 | Stop reason: HOSPADM

## 2025-06-17 RX ORDER — SODIUM CHLORIDE 9 MG/ML
INJECTION, SOLUTION INTRAMUSCULAR; INTRAVENOUS; SUBCUTANEOUS
Status: DISCONTINUED
Start: 2025-06-17 | End: 2025-06-17 | Stop reason: HOSPADM

## 2025-06-17 RX ORDER — DIPHENHYDRAMINE HYDROCHLORIDE 50 MG/ML
12.5 INJECTION, SOLUTION INTRAMUSCULAR; INTRAVENOUS
Status: DISCONTINUED | OUTPATIENT
Start: 2025-06-17 | End: 2025-06-17 | Stop reason: HOSPADM

## 2025-06-17 RX ORDER — SODIUM CHLORIDE 0.9 % (FLUSH) 0.9 %
5-40 SYRINGE (ML) INJECTION PRN
Status: DISCONTINUED | OUTPATIENT
Start: 2025-06-17 | End: 2025-06-17 | Stop reason: HOSPADM

## 2025-06-17 RX ORDER — ACETAMINOPHEN 325 MG/1
650 TABLET ORAL
Status: COMPLETED | OUTPATIENT
Start: 2025-06-17 | End: 2025-06-17

## 2025-06-17 RX ORDER — LIDOCAINE HYDROCHLORIDE 10 MG/ML
0.3 INJECTION, SOLUTION EPIDURAL; INFILTRATION; INTRACAUDAL; PERINEURAL
Status: DISCONTINUED | OUTPATIENT
Start: 2025-06-17 | End: 2025-06-17 | Stop reason: HOSPADM

## 2025-06-17 RX ORDER — LIDOCAINE HYDROCHLORIDE 20 MG/ML
INJECTION, SOLUTION EPIDURAL; INFILTRATION; INTRACAUDAL; PERINEURAL
Status: DISCONTINUED | OUTPATIENT
Start: 2025-06-17 | End: 2025-06-17 | Stop reason: SDUPTHER

## 2025-06-17 RX ORDER — LEVOFLOXACIN 5 MG/ML
500 INJECTION, SOLUTION INTRAVENOUS EVERY 24 HOURS
Status: DISCONTINUED | OUTPATIENT
Start: 2025-06-17 | End: 2025-06-17 | Stop reason: HOSPADM

## 2025-06-17 RX ORDER — SODIUM CHLORIDE, SODIUM LACTATE, POTASSIUM CHLORIDE, CALCIUM CHLORIDE 600; 310; 30; 20 MG/100ML; MG/100ML; MG/100ML; MG/100ML
INJECTION, SOLUTION INTRAVENOUS CONTINUOUS
Status: DISCONTINUED | OUTPATIENT
Start: 2025-06-17 | End: 2025-06-17 | Stop reason: HOSPADM

## 2025-06-17 RX ORDER — ONDANSETRON 2 MG/ML
INJECTION INTRAMUSCULAR; INTRAVENOUS
Status: DISCONTINUED | OUTPATIENT
Start: 2025-06-17 | End: 2025-06-17 | Stop reason: SDUPTHER

## 2025-06-17 RX ORDER — FENTANYL CITRATE 50 UG/ML
INJECTION, SOLUTION INTRAMUSCULAR; INTRAVENOUS
Status: DISCONTINUED | OUTPATIENT
Start: 2025-06-17 | End: 2025-06-17 | Stop reason: SDUPTHER

## 2025-06-17 RX ADMIN — ONDANSETRON 4 MG: 2 INJECTION INTRAMUSCULAR; INTRAVENOUS at 14:36

## 2025-06-17 RX ADMIN — PROPOFOL 50 MG: 10 INJECTION, EMULSION INTRAVENOUS at 14:32

## 2025-06-17 RX ADMIN — PROPOFOL 100 MG: 10 INJECTION, EMULSION INTRAVENOUS at 14:10

## 2025-06-17 RX ADMIN — ACETAMINOPHEN 650 MG: 325 TABLET ORAL at 15:33

## 2025-06-17 RX ADMIN — FENTANYL CITRATE 25 MCG: 50 INJECTION INTRAMUSCULAR; INTRAVENOUS at 14:10

## 2025-06-17 RX ADMIN — SODIUM CHLORIDE, POTASSIUM CHLORIDE, SODIUM LACTATE AND CALCIUM CHLORIDE: 600; 310; 30; 20 INJECTION, SOLUTION INTRAVENOUS at 14:10

## 2025-06-17 RX ADMIN — PROPOFOL 30 MG: 10 INJECTION, EMULSION INTRAVENOUS at 14:17

## 2025-06-17 RX ADMIN — SODIUM CHLORIDE 2000 MG: 9 INJECTION, SOLUTION INTRAVENOUS at 14:14

## 2025-06-17 RX ADMIN — LIDOCAINE HYDROCHLORIDE 100 MG: 20 INJECTION, SOLUTION EPIDURAL; INFILTRATION; INTRACAUDAL; PERINEURAL at 14:10

## 2025-06-17 RX ADMIN — LEVOFLOXACIN 500 MG: 5 INJECTION, SOLUTION INTRAVENOUS at 14:14

## 2025-06-17 ASSESSMENT — PAIN DESCRIPTION - ORIENTATION: ORIENTATION: MID

## 2025-06-17 ASSESSMENT — PAIN DESCRIPTION - LOCATION: LOCATION: OTHER (COMMENT)

## 2025-06-17 ASSESSMENT — PAIN SCALES - WONG BAKER
WONGBAKER_NUMERICALRESPONSE: NO HURT

## 2025-06-17 ASSESSMENT — PAIN SCALES - GENERAL
PAINLEVEL_OUTOF10: 0
PAINLEVEL_OUTOF10: 3

## 2025-06-17 ASSESSMENT — PAIN DESCRIPTION - DESCRIPTORS: DESCRIPTORS: DISCOMFORT

## 2025-06-17 ASSESSMENT — PAIN - FUNCTIONAL ASSESSMENT
PAIN_FUNCTIONAL_ASSESSMENT: 0-10
PAIN_FUNCTIONAL_ASSESSMENT: 0-10

## 2025-06-17 NOTE — H&P
H&P reviewed. The patient was examined and there are no changes to the H&P.     Hp from hospital notes, office notes, or printed at bedside.  See all documents including the scanned Documents.   These were reviewed with the patient and I examined the patient.  There was no change.  The surgical site was confirmed by the patient and me.     Vitals:    06/17/25 1233   BP: 116/68   Pulse: 84   Resp: 16   Temp: 97.6 °F (36.4 °C)   SpO2: 98%           Plan: The risks, benefits, expected outcome, and alternative to the recommended procedure have been discussed with the patient. Patient understands and wants to proceed with the procedure.     All questions answered.

## 2025-06-17 NOTE — ANESTHESIA POSTPROCEDURE EVALUATION
Department of Anesthesiology  Postprocedure Note    Patient: Tj Barker  MRN: [id number]  YOB: 1940  Date of evaluation: 6/17/2025    Procedure Summary       Date: 06/17/25 Room / Location: [address]    Anesthesia Start: 1402 Anesthesia Stop: 1447    Procedure: CYSTOSCOPY SUPRAPUBIC CATHETER CHANGE (Bladder) Diagnosis:       Retention of urine      (Retention of urine [R33.9])    Surgeons: Aneudy Hawthorne MD Responsible Provider: Doron Sher MD    Anesthesia Type: general ASA Status: 3            Anesthesia Type: No value filed.    Arturo Phase I: Arturo Score: 10    Arturo Phase II: Arturo Score: 10    Anesthesia Post Evaluation    Comments: Postoperative Anesthesia Note    Name:    Tj Barker  MRN:      [id number]    Patient Vitals in the past 12 hrs:  06/17/25 1535, BP:(!) 149/87, Pulse:92, Resp:16, SpO2:94 %  06/17/25 1518, BP:(!) 160/96, Pulse:91, Resp:18, SpO2:97 %  06/17/25 1514, BP:135/75, Pulse:89, Resp:13, SpO2:100 %  06/17/25 1451, BP:135/75, Pulse:86, Resp:13, SpO2:97 %  06/17/25 1446, BP:128/75, Pulse:84, Resp:14, SpO2:95 %  06/17/25 1441, BP:93/61, Temp:98 °F (36.7 °C), Temp src:Axillary, Pulse:74, Resp:14, SpO2:96 %  06/17/25 1233, BP:116/68, Temp:97.6 °F (36.4 °C), Temp src:Oral, Pulse:84, Resp:16, SpO2:98 %, Height:1.778 m (5' 10\"), Weight:69.9 kg (154 lb)     LABS:    CBC  Lab Results       Component                Value               Date/Time                  WBC                      6.7                 05/02/2025 12:46 PM        HGB                      12.3 (L)            05/02/2025 12:46 PM        HCT                      36.9 (L)            05/02/2025 12:46 PM        PLT                      241                 05/02/2025 12:46 PM   RENAL  Lab Results       Component                Value               Date/Time                  NA                       139                 05/02/2025 12:46 PM        K                        3.6

## 2025-06-17 NOTE — DISCHARGE INSTRUCTIONS
Follow up in 4 weeks for SP tube change  We can call you with pathology results and plan further management re: biopsy    Aneudy Hawthorne MD  Office: [phone]           Bladder Biopsy:  The doctor will put a thin, lighted tool called a cystoscope, or scope, into your urethra. The urethra is the tube that carries urine from the bladder to the outside of the body. Then the doctor will gently thread the scope into your bladder. Your bladder will then be filled with fluid. This stretches the bladder so that your doctor can clearly see the inside of your bladder. Your doctor will use small surgical tools through the scope to remove a piece of tissue.          Follow-up care is a key part of your treatment and safety. Be sure to make and go to all appointments, and call your doctor if you are having problems. It's also a good idea to know your test results and keep a list of the medicines you take.    © 8306-1917 [employer]. Care instructions adapted under license by [employer]. If you have questions about a medical condition or this instruction, always ask your healthcare professional. [employer] disclaims any warranty or liability for your use of this information.

## 2025-06-17 NOTE — CONSULTS
Patient:  Tj Barker  YOB: 1940   CSN:  [id number]    Referring Provider:  No ref. provider found   Primary Care Provider:  Mal Em MD       Urology Attending Consult Note     Reason for Consultation:   sp tube malfunction    Chief Complaint: \"they are changing tube\"  HPI:  Tj is a 85 y.o. male who presented with history of       Ct reviewed as below    Past Medical History:   Diagnosis Date    BPH (benign prostatic hyperplasia)     Cancer (HCC)     skin    Hyperlipidemia     Hypertension     Parkinson disease (HCC)        Past Surgical History:   Procedure Laterality Date    CT DRAINAGE VISCERAL PERCUTANEOUS  1/27/2025    CT DRAINAGE VISCERAL PERCUTANEOUS 1/27/2025 [facility] CT SCAN    INGUINAL HERNIA REPAIR Bilateral 6/3/2016    BILATERAL INGUINAL HERNIA REPAIR WITH MESH    OTHER SURGICAL HISTORY      skin cancer removed from back    OTHER SURGICAL HISTORY Right 6/3/2016    right spermatocelectomy       Medication List reviewed:      Current Facility-Administered Medications   Medication Dose Route Frequency Provider Last Rate Last Admin    ceFAZolin (ANCEF) 2,000 mg in sodium chloride 0.9 % 50 mL IVPB (addEASE)  2,000 mg IntraVENous Once Aneudy Hawthorne MD        lidocaine PF 1 % injection 0.3 mL  0.3 mL IntraDERmal Once PRN Lillie Arteaga MD        lactated ringers infusion   IntraVENous Continuous Lillie Arteaga MD        sodium chloride flush 0.9 % injection 5-40 mL  5-40 mL IntraVENous 2 times per day Lillie Arteaga MD        sodium chloride flush 0.9 % injection 5-40 mL  5-40 mL IntraVENous PRN Lillie Arteaga MD        0.9 % sodium chloride infusion   IntraVENous PRN Lillie Arteaga MD        sodium chloride (PF) 0.9 % injection                Allergies   Allergen Reactions    Oxycodone Rash       Family History   Problem Relation Age of Onset    Cancer Mother     Cancer Father        Social History     Tobacco Use    Smoking status: Former   Substance Use Topics    Alcohol

## 2025-06-17 NOTE — PROGRESS NOTES
Went over patient discharge paperwork with him and his daughters. Patient IV taken out, no evidence of phlebitis or infiltration.

## 2025-06-17 NOTE — BRIEF OP NOTE
Brief Postoperative Note      Patient: Tj Barker  YOB: 1940  MRN: [id number]    Date of Procedure: 6/17/2025    Pre-Op Diagnosis Codes:      * Retention of urine [R33.9]    Post-Op Diagnosis: Same; bladder lesion       Procedure(s):  CYSTOSCOPY SUPRAPUBIC CATHETER CHANGE    Dilation of sp tract to 20F sp tube  Cysto Bladder bx at [facility]/ fulgeration bleeding points    Surgeon(s):  Aneudy Hawthorne MD    Assistant:  Surgical Assistant: Nathan Aceves    Anesthesia: General    Estimated Blood Loss (mL): Minimal    Complications: None    Specimens:   ID Type Source Tests Collected by Time Destination   A : Bladder neck Tissue Tissue SURGICAL PATHOLOGY Aneudy Hawthorne MD 6/17/2025 1424        Implants:  * No implants in log *      Drains:   Suprapubic Catheter (Active)       Findings:  Infection Present At Time Of Surgery (PATOS) (choose all levels that have infection present):  No infection present  Other Findings:   See detailed dictated operative notes for more procedural details    20F sp tube  Bladder bx at [facility]    Electronically signed by Aneudy Hawthorne MD on 6/17/2025 at 2:49 PM

## 2025-06-17 NOTE — OP NOTE
Operative Note      Patient: Tj Barker  YOB: 1940  MRN: [id number]    Date of Procedure: 6/17/2025    Pre-Operative Diagnosis: Neurogenic bladder, recurrent cystitis  Post-Operative Diagnosis: Bladder lesion, prostatic and bladder bleeding points    Procedure Performed:    1) Cystoscopy with bladder biopsy 1cm & fulguration prostatic and bladder bleeding points  2) complicated exchange of suprapubic catheter with dilation of suprapubic tract, Tarik [place] tip dilators and upsizing    Surgeon:  Aneudy Hawthorne MD  Anesthesia: General   Drains/Tubes:   20F [place] tip Padilla catheter with 10 mL of sterile water in balloon  Specimens: bladder neck lesion x2  Estimated Blood Loss: 20 mL  Complications: None    Findings:   -Small papillary appearing area at the bladder neck did not look like typical Padilla edema.  About 1 cm  - Trabeculated bladder, multiple small tics, urethra is grossly normal although some tightness near the meatus  - SP tube at the dome  - Digital rectal exam normal, prostate fossa fairly effaced 60 g or so.      Indications: Patient suffers from parkinsonian symptoms is intermittent recurrent cystitis urinary retention eventually transition to suprapubic catheter, is poorly mobile and they have had difficulty with transportation and has had difficulty exchanging or trying upsize the catheter in the office is currently a 16 Bengali SP tube, cystoscopy upsize catheter reviewed, 2 family members present with patient who signed the consent      Procedure Description:  After obtaining informed consent, the patient was brought to the operating room. After adequate anesthesia, the patient was then repositioned in the dorsal lithotomy position and prepped and draped in the standard surgical fashion. I began the case by first doing rigid cystoscopy with a 21-Bengali sheath and a 30 degree lens. The urethra/prostate findings are above. Once in the bladder, I saw no evidence of stones. His

## (undated) DEVICE — FOLLOWER URETH COUDE TIP 20 FRX13.35 IN DIL CATH HEYMAN

## (undated) DEVICE — Device

## (undated) DEVICE — CATHETER URETH 16FR BLLN 5CC STD LTX 2 W F TWO OPP DRNGE

## (undated) DEVICE — SUTURE VICRYL + SZ 2-0 L27IN ABSRB WHT SH 1/2 CIR TAPERCUT VCP417H

## (undated) DEVICE — COVER LT HNDL PLAS RIG 1 PER PK

## (undated) DEVICE — GLOVE ORANGE PI 7   MSG9070

## (undated) DEVICE — URETHRAL DILATOR SET: Brand: COOK

## (undated) DEVICE — MHCZ CYSTO: Brand: MEDLINE INDUSTRIES, INC.

## (undated) DEVICE — BAG DRAINAGE 2000ML UROLOGY ANTI REFLUX CHAMBER SAMPLE PORT

## (undated) DEVICE — CATHETER IV 14GA L5.25IN PERIPH ORNG FEP POLYMER 3 BVL

## (undated) DEVICE — MHCZ MINOR: Brand: MEDLINE INDUSTRIES, INC.

## (undated) DEVICE — GUIDEWIRE URO L145CM DIA0.035IN TIP L3.5CM PTFE FLX AMPLATZ

## (undated) DEVICE — FOLLOWER URETH 16FR 339CM COUDE TIP THRD PLAS ST HEYMAN

## (undated) DEVICE — CATHETER URETH BLLN 5CC 20FR F 2 W SPEC COUNCL MOD SHT OPN

## (undated) DEVICE — BLADE SURG NO15 S STL STR DISP GLASSVAN

## (undated) DEVICE — SUTURE PERMAHAND SZ 2-0 L18IN NONABSORBABLE BLK L26MM FS 685G

## (undated) DEVICE — PAD,NON-ADHERENT,3X8,STERILE,LF,1/PK: Brand: MEDLINE

## (undated) DEVICE — SOLUTION IRRIG 1000ML STRL H2O USP PLAS POUR BTL

## (undated) DEVICE — GAUZE,SPONGE,4"X4",16PLY,XRAY,STRL,LF: Brand: MEDLINE

## (undated) DEVICE — TERUMO NEEDLE: Brand: TERUMO

## (undated) DEVICE — FOLLOWER URETH 12FR 339CM COUDE TIP THRD PLAS ST HEYMAN